# Patient Record
Sex: MALE | Race: AMERICAN INDIAN OR ALASKA NATIVE
[De-identification: names, ages, dates, MRNs, and addresses within clinical notes are randomized per-mention and may not be internally consistent; named-entity substitution may affect disease eponyms.]

---

## 2017-08-11 ENCOUNTER — HOSPITAL ENCOUNTER (EMERGENCY)
Dept: HOSPITAL 31 - C.ER | Age: 57
Discharge: HOME | End: 2017-08-11
Payer: COMMERCIAL

## 2017-08-11 VITALS
TEMPERATURE: 97.7 F | SYSTOLIC BLOOD PRESSURE: 201 MMHG | OXYGEN SATURATION: 99 % | HEART RATE: 88 BPM | DIASTOLIC BLOOD PRESSURE: 91 MMHG

## 2017-08-11 VITALS — RESPIRATION RATE: 18 BRPM

## 2017-08-11 DIAGNOSIS — I10: Primary | ICD-10-CM

## 2017-08-11 LAB
ALBUMIN/GLOB SERPL: 1.1 {RATIO} (ref 1–2.1)
ALP SERPL-CCNC: 77 U/L (ref 38–126)
ALT SERPL-CCNC: 32 U/L (ref 21–72)
AST SERPL-CCNC: 37 U/L (ref 17–59)
BASOPHILS # BLD AUTO: 0 K/UL (ref 0–0.2)
BASOPHILS NFR BLD: 0.7 % (ref 0–2)
BILIRUB SERPL-MCNC: 0.7 MG/DL (ref 0.2–1.3)
BUN SERPL-MCNC: 14 MG/DL (ref 9–20)
CALCIUM SERPL-MCNC: 8.8 MG/DL (ref 8.6–10.4)
CHLORIDE SERPL-SCNC: 107 MMOL/L (ref 98–107)
CO2 SERPL-SCNC: 23 MMOL/L (ref 22–30)
EOSINOPHIL # BLD AUTO: 0.1 K/UL (ref 0–0.7)
EOSINOPHIL NFR BLD: 1.4 % (ref 0–4)
ERYTHROCYTE [DISTWIDTH] IN BLOOD BY AUTOMATED COUNT: 13.8 % (ref 11.5–14.5)
GLOBULIN SER-MCNC: 3.5 GM/DL (ref 2.2–3.9)
GLUCOSE SERPL-MCNC: 100 MG/DL (ref 75–110)
HCT VFR BLD CALC: 46 % (ref 35–51)
LYMPHOCYTES # BLD AUTO: 1.7 K/UL (ref 1–4.3)
LYMPHOCYTES NFR BLD AUTO: 34.4 % (ref 20–40)
MCH RBC QN AUTO: 30.8 PG (ref 27–31)
MCHC RBC AUTO-ENTMCNC: 33.3 G/DL (ref 33–37)
MCV RBC AUTO: 92.7 FL (ref 80–94)
MONOCYTES # BLD: 0.5 K/UL (ref 0–0.8)
MONOCYTES NFR BLD: 10.4 % (ref 0–10)
NRBC BLD AUTO-RTO: 0.1 % (ref 0–2)
PLATELET # BLD: 157 K/UL (ref 130–400)
PMV BLD AUTO: 11 FL (ref 7.2–11.7)
POTASSIUM SERPL-SCNC: 3.9 MMOL/L (ref 3.6–5.2)
PROT SERPL-MCNC: 7.2 G/DL (ref 6.3–8.3)
SODIUM SERPL-SCNC: 142 MMOL/L (ref 132–148)
WBC # BLD AUTO: 4.9 K/UL (ref 4.8–10.8)

## 2017-08-11 PROCEDURE — 71010: CPT

## 2017-08-11 PROCEDURE — 83880 ASSAY OF NATRIURETIC PEPTIDE: CPT

## 2017-08-11 PROCEDURE — 80053 COMPREHEN METABOLIC PANEL: CPT

## 2017-08-11 PROCEDURE — 96374 THER/PROPH/DIAG INJ IV PUSH: CPT

## 2017-08-11 PROCEDURE — 85025 COMPLETE CBC W/AUTO DIFF WBC: CPT

## 2017-08-11 PROCEDURE — 99284 EMERGENCY DEPT VISIT MOD MDM: CPT

## 2017-08-11 PROCEDURE — 84484 ASSAY OF TROPONIN QUANT: CPT

## 2017-08-11 NOTE — C.PDOC
History Of Present Illness


55 y/o male presents to ED with complaints of intermittent chest pain for 5 

days and occasional sob after walking flight of stairs. Patient reports he has 

not seen PMD in 2 years. AT ed patient is currently pain free and denies fever, 

cough, nausea, vomiting, recent travel, change in appetite or any other 

complaints at this time. 


Time Seen by Provider: 17 13:08


Chief Complaint (Nursing): Chest Pain


History Per: Patient


History/Exam Limitations: no limitations


Onset/Duration Of Symptoms: Days


Current Symptoms Are (Timing): Still Present





Past Medical History


Reviewed: Historical Data, Nursing Documentation, Vital Signs


Vital Signs: 


 Last Vital Signs











Temp  97.7 F   17 14:49


 


Pulse  88   17 14:49


 


Resp  18   17 14:49


 


BP  201/91 H  17 14:49


 


Pulse Ox  99   17 17:50














- Medical History


PMH: HTN


Family History: States: No Known Family Hx





- Social History


Hx Alcohol Use: Yes


Hx Substance Use: No





- Immunization History


Hx Tetanus Toxoid Vaccination: No


Hx Influenza Vaccination: No


Hx Pneumococcal Vaccination: No





Review Of Systems


Except As Marked, All Systems Reviewed And Found Negative.


Constitutional: Negative for: Fever, Chills


Cardiovascular: Positive for: Chest Pain


Respiratory: Positive for: Shortness of Breath.  Negative for: Cough


Gastrointestinal: Negative for: Nausea, Vomiting


Skin: Negative for: Rash





Physical Exam





- Physical Exam


Appears: Non-toxic, No Acute Distress


Skin: Normal Color, Warm, No Rash


Head: Atraumatic, Normacephalic


Eye(s): bilateral: Normal Inspection


Oral Mucosa: Moist


Chest: Symmetrical


Cardiovascular: Rhythm Regular


Respiratory: Normal Breath Sounds, No Rales, No Rhonchi, No Wheezing


Gastrointestinal/Abdominal: Soft, No Tenderness, No Guarding, No Rebound


Neurological/Psych: Oriented x3, Normal Speech





ED Course And Treatment





- Laboratory Results


Result Diagrams: 


 17 13:42





 17 13:42


ECG: Interpreted By Me, Viewed By Me


ECG Rhythm: Sinus Rhythm


Interpretation Of ECG: LVH with repolarization abnormality


Rate From EC (bpm)


O2 Sat by Pulse Oximetry: 99 (RA)


Pulse Ox Interpretation: Normal





Medical Decision Making


Medical Decision Making: 


Patient will be discharged and advised to follow up with PMD. 





Disposition





- Disposition


Referrals: 


 Service [Outside]


[facility] [Outside]


Disposition: HOME/ ROUTINE


Disposition Time: 14:20


Condition: GOOD


Additional Instructions: 





Thank you for letting us take care of you today. Your provider was Dr. Sevilla. You were treated for non-cardiac chest pain. The emergency medical 

care you received today was directed at your acute symptoms. If you were 

prescribed any medication, please fill it and take as directed. It may take 

several days for your symptoms to resolve. Return to the Emergency Department 

if your symptoms worsen, do not improve, or if you have any other problems.





Please contact your doctor or call one of the physicians/clinics you have been 

referred to that are listed on the Patient Visit Information form that is 

included in your discharge packet. Bring any paperwork you were given at 

discharge with you along with any medications you are taking to your follow up 

visit. Our treatment cannot replace ongoing medical care by a primary care 

provider (PCP) outside of the emergency department.





Thank you for allowing the [facility] team to be part of your care today.














Follow up with the clinic in 3-4 days for outpatient care and further 

management.


Prescriptions: 


Hydrochlorothiazide [Microzide] 12.5 mg PO DAILY #7 cap


Ibuprofen [Motrin] 600 mg PO Q6 PRN #20 tab


 PRN Reason: Pain, Moderate (4-7)


Instructions:  Low Sodium Diet (ED), Hypertension (ED)


Forms:  [facility] (English)





- Clinical Impression


Clinical Impression: 


 Hypertension








- Scribe Statement


The provider has reviewed the documentation as recorded by the Isaiasibcarole Tinajero





All medical record entries made by the Scribe were at my direction and 

personally dictated by me. I have reviewed the chart and agree that the record 

accurately reflects my personal performance of the history, physical exam, 

medical decision making, and the department course for this patient. I have 

also personally directed, reviewed, and agree with the discharge instructions 

and disposition.

## 2017-08-14 NOTE — CARD
--------------- APPROVED REPORT --------------





EKG Measurement

Heart [id number]

IA [id number]

[id number]

[id number]

[id number]



<Conclusion>

Normal sinus rhythm

Possible Left atrial enlargement

Left ventricular hypertrophy with repolarization abnormality

Prolonged QT

Abnormal ECG

## 2018-10-04 ENCOUNTER — HOSPITAL ENCOUNTER (INPATIENT)
Dept: HOSPITAL 31 - C.ER | Age: 58
LOS: 4 days | Discharge: LEFT BEFORE DISCHARGE | DRG: 292 | End: 2018-10-08
Attending: FAMILY MEDICINE | Admitting: FAMILY MEDICINE
Payer: SELF-PAY

## 2018-10-04 VITALS — BODY MASS INDEX: 35 KG/M2

## 2018-10-04 VITALS — RESPIRATION RATE: 20 BRPM

## 2018-10-04 DIAGNOSIS — I50.22: ICD-10-CM

## 2018-10-04 DIAGNOSIS — E66.9: ICD-10-CM

## 2018-10-04 DIAGNOSIS — J98.11: ICD-10-CM

## 2018-10-04 DIAGNOSIS — Z80.51: ICD-10-CM

## 2018-10-04 DIAGNOSIS — F17.210: ICD-10-CM

## 2018-10-04 DIAGNOSIS — G47.33: ICD-10-CM

## 2018-10-04 DIAGNOSIS — I20.9: ICD-10-CM

## 2018-10-04 DIAGNOSIS — I11.0: Primary | ICD-10-CM

## 2018-10-04 DIAGNOSIS — N52.9: ICD-10-CM

## 2018-10-04 DIAGNOSIS — K57.30: ICD-10-CM

## 2018-10-04 DIAGNOSIS — I42.0: ICD-10-CM

## 2018-10-04 DIAGNOSIS — Z91.14: ICD-10-CM

## 2018-10-04 DIAGNOSIS — N28.1: ICD-10-CM

## 2018-10-04 LAB
ALBUMIN SERPL-MCNC: 3.7 G/DL (ref 3.5–5)
ALBUMIN/GLOB SERPL: 1.1 {RATIO} (ref 1–2.1)
ALT SERPL-CCNC: 22 U/L (ref 21–72)
APTT BLD: 35 SECONDS (ref 21–34)
AST SERPL-CCNC: 36 U/L (ref 17–59)
BACTERIA #/AREA URNS HPF: (no result) /[HPF]
BASOPHILS # BLD AUTO: 0.1 K/UL (ref 0–0.2)
BASOPHILS NFR BLD: 1 % (ref 0–2)
BILIRUB UR-MCNC: NEGATIVE MG/DL
BNP SERPL-MCNC: 3710 PG/ML (ref 0–900)
BUN SERPL-MCNC: 12 MG/DL (ref 9–20)
CALCIUM SERPL-MCNC: 8.8 MG/DL (ref 8.6–10.4)
CK MB SERPL-MCNC: 2.11 NG/ML (ref 0–3.38)
D DIMER: 470 NG/MLDDU (ref 0–243)
EOSINOPHIL # BLD AUTO: 0.1 K/UL (ref 0–0.7)
EOSINOPHIL NFR BLD: 1.2 % (ref 0–4)
ERYTHROCYTE [DISTWIDTH] IN BLOOD BY AUTOMATED COUNT: 14.4 % (ref 11.5–14.5)
GFR NON-AFRICAN AMERICAN: > 60
GLUCOSE UR STRIP-MCNC: NORMAL MG/DL
HGB BLD-MCNC: 15.3 G/DL (ref 12–18)
INR PPP: 1.1
LEUKOCYTE ESTERASE UR-ACNC: (no result) LEU/UL
LYMPHOCYTES # BLD AUTO: 1.2 K/UL (ref 1–4.3)
LYMPHOCYTES NFR BLD AUTO: 22.8 % (ref 20–40)
MCH RBC QN AUTO: 31.8 PG (ref 27–31)
MCHC RBC AUTO-ENTMCNC: 34.4 G/DL (ref 33–37)
MCV RBC AUTO: 92.5 FL (ref 80–94)
MONOCYTES # BLD: 0.6 K/UL (ref 0–0.8)
MONOCYTES NFR BLD: 10.3 % (ref 0–10)
NEUTROPHILS # BLD: 3.5 K/UL (ref 1.8–7)
NEUTROPHILS NFR BLD AUTO: 64.7 % (ref 50–75)
NRBC BLD AUTO-RTO: 0.1 % (ref 0–2)
PH UR STRIP: 6 [PH] (ref 5–8)
PLATELET # BLD: 204 K/UL (ref 130–400)
PMV BLD AUTO: 9.6 FL (ref 7.2–11.7)
PROT UR STRIP-MCNC: NEGATIVE MG/DL
PROTHROMBIN TIME: 12 SECONDS (ref 9.7–12.2)
RBC # BLD AUTO: 4.82 MIL/UL (ref 4.4–5.9)
RBC # UR STRIP: NEGATIVE /UL
SP GR UR STRIP: 1 (ref 1–1.03)
TROPONIN I SERPL-MCNC: 0.07 NG/ML (ref 0–0.12)
UROBILINOGEN UR-MCNC: NORMAL MG/DL (ref 0.2–1)
WBC # BLD AUTO: 5.5 K/UL (ref 4.8–10.8)

## 2018-10-04 NOTE — CP.PCM.HP
<Holly Martinez - Last Filed: 10/05/18 03:54>





History of Present Illness





- History of Present Illness


History of Present Illness: 





History and Physical - Hospitalist Service





CC: Progressive Dyspnea on exertion, chest pain





HPI: Patient is a 58 year old  male with past medical history of

Hypertension who presented to the emergency department for worsening dyspnea and

chest pain. Patient states that he has been having increasing dyspnea on 

exertion for the past 2-3 months. He states that he is able to walk up 1 flight 

of stairs before having to stop due to shortness of breath. He states that he 

sleeps with one pillow at night. Patient states that chest pain started a few m

onths ago as well. It is intermittent in nature and he believes is associated 

with shortness of breath. Chest pain is left sided and does not radiate. It was 

the most severe last night when he was laying down. Describes the pain as 

concurrent. He denies any alleviating or exacerbating factors. Not associated 

with movement. Denies diaphoresis, nausea, vomiting. He state that he fell 

walking down a flight of steps 3 months ago. He was not having chest pain or 

shortness of breath at that time. Admits to fatigue and not being able to sleep 

at night. He is unable to explain why he cannot sleep at night. Denies fevers, 

chills, nausea/vomiting, recent travel, sick contacts, headaches, dizziness, co

ugh, palpitations, abdominal pain, orthopnea, lower extremity swelling, urinary 

symptoms, changes in bowel habits, hematuria. 








ED Course: Lasix 20mg IVP, Vastec 40mg PO x 1, Nitro 2%





Allergies: NKDA


Medications: HCTZ 12.5mg PO daily (ran out months ago)


Medical History: Hypertension


Surgical History: Denies


Social History: Smokes 1 pack of cigarettes every 3 days, drinks 6 pack of beer 

on weekends occasionally, denies drug use; works as contractor, lives alone


Family History: Mother - brain tumor, CVA in 70s, Father - CVA in 80s, Brother -

Kidney cancer (), Brother - Leg cancer





Present on Admission





- Present on Admission


Any Indicators Present on Admission: No





Past Patient History





- Past Social History


Smoking Status: Light Smoker < 10 Cigarettes Daily





- CARDIAC


Hx Hypertension: Yes





- PSYCHIATRIC


Hx Substance Use: No





- SURGICAL HISTORY


Hx Surgeries: No





- ANESTHESIA


Hx Anesthesia: No





Meds


Allergies/Adverse Reactions: 


                                    Allergies











Allergy/AdvReac Type Severity Reaction Status Date / Time


 


No Known Allergies Allergy   Verified 10/04/18 15:03














Physical Exam





- Constitutional


Appears: Non-toxic, No Acute Distress





- Head Exam


Head Exam: ATRAUMATIC, NORMAL INSPECTION, NORMOCEPHALIC





- Eye Exam


Eye Exam: EOMI, Normal appearance


Pupil Exam: NORMAL ACCOMODATION





- ENT Exam


ENT Exam: Mucous Membranes Moist





- Neck Exam


Neck exam: Positive for: Full Rom.  Negative for: Tenderness





- Respiratory Exam


Respiratory Exam: Decreased Breath Sounds, NORMAL BREATHING PATTERN.  absent: 

Rales, Rhonchi, Wheezes





- Cardiovascular Exam


Cardiovascular Exam: REGULAR RHYTHM, +S1, +S2.  absent: Systolic Murmur





- GI/Abdominal Exam


GI & Abdominal Exam: Distended, Normal Bowel Sounds, Soft.  absent: Guarding, 

Hernia, Rebound, Rigid, Tenderness





- Extremities Exam


Extremities exam: Positive for: normal inspection, pedal pulses present.  

Negative for: calf tenderness, joint swelling, pedal edema, tenderness





- Back Exam


Back exam: CVA tenderness (R), NORMAL INSPECTION





- Neurological Exam


Neurological exam: Alert, Oriented x3





- Psychiatric Exam


Psychiatric exam: Normal Affect, Normal Mood





- Skin


Skin Exam: Dry, Normal Color, Warm





Results





- Vital Signs


Recent Vital Signs: 





                                Last Vital Signs











Temp  97.7 F   10/04/18 15:05


 


Pulse  81   10/04/18 18:44


 


Resp  19   10/04/18 18:44


 


BP  151/91 H  10/04/18 18:44


 


Pulse Ox  96   10/04/18 18:44














- Labs


Result Diagrams: 


                                 10/04/18 15:59





                                 10/04/18 15:59


Labs: 





                         Laboratory Results - last 24 hr











  10/04/18 10/04/18 10/04/18





  15:59 15:59 16:55


 


WBC  5.5  


 


RBC  4.82  


 


Hgb  15.3  


 


Hct  44.6  


 


MCV  92.5  


 


MCH  31.8 H  


 


MCHC  34.4  


 


RDW  14.4  


 


Plt Count  204  


 


MPV  9.6  


 


Neut % (Auto)  64.7  


 


Lymph % (Auto)  22.8  


 


Mono % (Auto)  10.3 H  


 


Eos % (Auto)  1.2  


 


Baso % (Auto)  1.0  


 


Neut # (Auto)  3.5  


 


Lymph # (Auto)  1.2  


 


Mono # (Auto)  0.6  


 


Eos # (Auto)  0.1  


 


Baso # (Auto)  0.1  


 


PT    12.0


 


INR    1.1


 


APTT    35 H


 


D-Dimer, Quantitative    470 H


 


Sodium   142 


 


Potassium   5.1 


 


Chloride   109 H 


 


Carbon Dioxide   23 


 


Anion Gap   15 


 


BUN   12 


 


Creatinine   1.0 


 


Est GFR ( Amer)   > 60 


 


Est GFR (Non-Af Amer)   > 60 


 


Random Glucose   98 


 


Calcium   8.8 


 


Total Bilirubin   1.1 


 


AST   36 


 


ALT   22 


 


Alkaline Phosphatase   61 


 


Troponin I   0.0680 


 


NT-Pro-B Natriuret Pep   3710 H 


 


Total Protein   7.1 


 


Albumin   3.7 


 


Globulin   3.4 


 


Albumin/Globulin Ratio   1.1 


 


Urine Color   


 


Urine Clarity   


 


Urine pH   


 


Ur Specific Gravity   


 


Urine Protein   


 


Urine Glucose (UA)   


 


Urine Ketones   


 


Urine Blood   


 


Urine Nitrate   


 


Urine Bilirubin   


 


Urine Urobilinogen   


 


Ur Leukocyte Esterase   


 


Urine WBC (Auto)   


 


Urine Bacteria   


 


Urine Opiates Screen   


 


Urine Methadone Screen   


 


Ur Barbiturates Screen   


 


Ur Phencyclidine Scrn   


 


Ur Amphetamines Screen   


 


U Benzodiazepines Scrn   


 


U Oth Cocaine Metabols   


 


U Cannabinoids Screen   














  10/04/18 10/04/18





  17:11 17:11


 


WBC  


 


RBC  


 


Hgb  


 


Hct  


 


MCV  


 


MCH  


 


MCHC  


 


RDW  


 


Plt Count  


 


MPV  


 


Neut % (Auto)  


 


Lymph % (Auto)  


 


Mono % (Auto)  


 


Eos % (Auto)  


 


Baso % (Auto)  


 


Neut # (Auto)  


 


Lymph # (Auto)  


 


Mono # (Auto)  


 


Eos # (Auto)  


 


Baso # (Auto)  


 


PT  


 


INR  


 


APTT  


 


D-Dimer, Quantitative  


 


Sodium  


 


Potassium  


 


Chloride  


 


Carbon Dioxide  


 


Anion Gap  


 


BUN  


 


Creatinine  


 


Est GFR ( Amer)  


 


Est GFR (Non-Af Amer)  


 


Random Glucose  


 


Calcium  


 


Total Bilirubin  


 


AST  


 


ALT  


 


Alkaline Phosphatase  


 


Troponin I  


 


NT-Pro-B Natriuret Pep  


 


Total Protein  


 


Albumin  


 


Globulin  


 


Albumin/Globulin Ratio  


 


Urine Color  Yellow 


 


Urine Clarity  Clear 


 


Urine pH  6.0 


 


Ur Specific Gravity  1.004 


 


Urine Protein  Negative 


 


Urine Glucose (UA)  Normal 


 


Urine Ketones  Negative 


 


Urine Blood  Negative 


 


Urine Nitrate  Negative 


 


Urine Bilirubin  Negative 


 


Urine Urobilinogen  Normal 


 


Ur Leukocyte Esterase  Neg 


 


Urine WBC (Auto)  < 1 


 


Urine Bacteria  Rare 


 


Urine Opiates Screen   Negative


 


Urine Methadone Screen   Negative


 


Ur Barbiturates Screen   Negative


 


Ur Phencyclidine Scrn   Negative


 


Ur Amphetamines Screen   Negative


 


U Benzodiazepines Scrn   Negative


 


U Oth Cocaine Metabols   Negative


 


U Cannabinoids Screen   Negative














Assessment & Plan





- Assessment and Plan (Free Text)


Assessment: 





A/P: Patient is a 58 year old  male with past medical history of

Hypertension who presented to the Emergency Dept for worsening dyspnea and left 

sided chest pain.





Dyspnea likely secondary to new onset CHF


-Stable, afebrile


-EKG showed LVH with inverted T waves


-Initial Troponin negative, Trend MARGE Q8H x 2


-CXR showed Cardiomegaly with increased pulmonary vascular congestion (see full 

report)


-S/P Lasix 20mg IVP x 1


-Echocardiogram ordered


-Start on Lisinopril 10mg daily, Coreg 3.125mg PO BID


-Continue Lasix 20mg IVP daily


-Daily weights, Intake and output


-Cardiology on consult, Dr Rocha, help appreciated





Chest pain, r/o ACS


-EKG showed LVH with inverted T waves


-Initial Troponin negative, Trend MARGE Q8H x 2


-F/U TSH/Free T4, Lipid panel, Hemoglobin A1C


-Cardiology on consult, Dr Rocha, help appreciated





Hypertension


-Started on Lisinopril 10mg PO daily, Coreg 3.125mg PO BID


-Monitor Vitals





Renal Cysts


-Up to 6.6cm renal cyst noted on abdominal US


-Will order CT abd/pelvis for further evaluation





Tobacco abuse


-Cessation encouraged





GI/DVT ppx:


-Protonix 40mg PO daily


-Lovenox 40mg SC daily





Plan discussed with Dr Gustabo Martinez DO PGY-2





<Daniel Montejo P - Last Filed: 10/05/18 07:09>





Results





- Vital Signs


Recent Vital Signs: 





                                Last Vital Signs











Temp  98.1 F   10/05/18 04:00


 


Pulse  78   10/05/18 04:00


 


Resp  20   10/05/18 04:00


 


BP  148/100 H  10/05/18 04:00


 


Pulse Ox  96   10/05/18 04:00














- Labs


Result Diagrams: 


                                 10/04/18 15:59





                                 10/04/18 15:59


Labs: 





                         Laboratory Results - last 24 hr











  10/04/18 10/04/18 10/04/18





  15:59 15:59 16:55


 


WBC  5.5  


 


RBC  4.82  


 


Hgb  15.3  


 


Hct  44.6  


 


MCV  92.5  


 


MCH  31.8 H  


 


MCHC  34.4  


 


RDW  14.4  


 


Plt Count  204  


 


MPV  9.6  


 


Neut % (Auto)  64.7  


 


Lymph % (Auto)  22.8  


 


Mono % (Auto)  10.3 H  


 


Eos % (Auto)  1.2  


 


Baso % (Auto)  1.0  


 


Neut # (Auto)  3.5  


 


Lymph # (Auto)  1.2  


 


Mono # (Auto)  0.6  


 


Eos # (Auto)  0.1  


 


Baso # (Auto)  0.1  


 


PT    12.0


 


INR    1.1


 


APTT    35 H


 


D-Dimer, Quantitative    470 H


 


Sodium   142 


 


Potassium   5.1 


 


Chloride   109 H 


 


Carbon Dioxide   23 


 


Anion Gap   15 


 


BUN   12 


 


Creatinine   1.0 


 


Est GFR ( Amer)   > 60 


 


Est GFR (Non-Af Amer)   > 60 


 


Random Glucose   98 


 


Calcium   8.8 


 


Total Bilirubin   1.1 


 


AST   36 


 


ALT   22 


 


Alkaline Phosphatase   61 


 


Total Creatine Kinase   


 


CK-MB (Mass)   


 


Troponin I   0.0680 


 


NT-Pro-B Natriuret Pep   3710 H 


 


Total Protein   7.1 


 


Albumin   3.7 


 


Globulin   3.4 


 


Albumin/Globulin Ratio   1.1 


 


Urine Color   


 


Urine Clarity   


 


Urine pH   


 


Ur Specific Gravity   


 


Urine Protein   


 


Urine Glucose (UA)   


 


Urine Ketones   


 


Urine Blood   


 


Urine Nitrate   


 


Urine Bilirubin   


 


Urine Urobilinogen   


 


Ur Leukocyte Esterase   


 


Urine WBC (Auto)   


 


Urine Bacteria   


 


Urine Opiates Screen   


 


Urine Methadone Screen   


 


Ur Barbiturates Screen   


 


Ur Phencyclidine Scrn   


 


Ur Amphetamines Screen   


 


U Benzodiazepines Scrn   


 


U Oth Cocaine Metabols   


 


U Cannabinoids Screen   














  10/04/18 10/04/18 10/04/18





  17:11 17:11 22:40


 


WBC   


 


RBC   


 


Hgb   


 


Hct   


 


MCV   


 


MCH   


 


MCHC   


 


RDW   


 


Plt Count   


 


MPV   


 


Neut % (Auto)   


 


Lymph % (Auto)   


 


Mono % (Auto)   


 


Eos % (Auto)   


 


Baso % (Auto)   


 


Neut # (Auto)   


 


Lymph # (Auto)   


 


Mono # (Auto)   


 


Eos # (Auto)   


 


Baso # (Auto)   


 


PT   


 


INR   


 


APTT   


 


D-Dimer, Quantitative   


 


Sodium   


 


Potassium   


 


Chloride   


 


Carbon Dioxide   


 


Anion Gap   


 


BUN   


 


Creatinine   


 


Est GFR ( Amer)   


 


Est GFR (Non-Af Amer)   


 


Random Glucose   


 


Calcium   


 


Total Bilirubin   


 


AST   


 


ALT   


 


Alkaline Phosphatase   


 


Total Creatine Kinase    134


 


CK-MB (Mass)    2.11


 


Troponin I    0.0680


 


NT-Pro-B Natriuret Pep   


 


Total Protein   


 


Albumin   


 


Globulin   


 


Albumin/Globulin Ratio   


 


Urine Color  Yellow  


 


Urine Clarity  Clear  


 


Urine pH  6.0  


 


Ur Specific Gravity  1.004  


 


Urine Protein  Negative  


 


Urine Glucose (UA)  Normal  


 


Urine Ketones  Negative  


 


Urine Blood  Negative  


 


Urine Nitrate  Negative  


 


Urine Bilirubin  Negative  


 


Urine Urobilinogen  Normal  


 


Ur Leukocyte Esterase  Neg  


 


Urine WBC (Auto)  < 1  


 


Urine Bacteria  Rare  


 


Urine Opiates Screen   Negative 


 


Urine Methadone Screen   Negative 


 


Ur Barbiturates Screen   Negative 


 


Ur Phencyclidine Scrn   Negative 


 


Ur Amphetamines Screen   Negative 


 


U Benzodiazepines Scrn   Negative 


 


U Oth Cocaine Metabols   Negative 


 


U Cannabinoids Screen   Negative 














Attending/Attestation





- Attestation


I have personally seen and examined this patient.: Yes


I have fully participated in the care of the patient.: Yes


I have reviewed all pertinent clinical information: Yes


Notes (Text): 





10/05/18 06:56


Progressive sob and cp on exertion, no orthpnea, clinically not volume overload,

LVH with strain pattern on the ekg, with uncontrolled htn, DD of hypertensive 

cardiomyopathy vs previous with new angina.


Noncompliance with meds


Abdominal distension likely form fat, weight gain


Tobacco abuse





Plan


ACEI, Beta blocker for cardiac remodelling, added hctz for better control of BP 

as well


ASA, Crestor as he as risk factors and cad clinically


Echo in patient


Stress test in patient vs out patient


Counselled about tobacco cessation, compliance with meds and health


Abd/pelvis ct with oral contrast ordered due to increased abd girth


Gi/dvt prophylaxis 


Cardiology consult


See orders for detail.

## 2018-10-04 NOTE — RAD
Date of service: 



10/04/2018



PROCEDURE:  CHEST RADIOGRAPH, 1 VIEW



HISTORY:

SOB



COMPARISON:

08/11/2017



FINDINGS:



LUNGS:

Pulmonary vascular congestion.  No discrete infiltrates.



PLEURA:

No pneumothorax or pleural fluid seen.



CARDIOVASCULAR:

Cardiomegaly.



OSSEOUS STRUCTURES:

No significant abnormalities.



VISUALIZED UPPER ABDOMEN:

Normal.



OTHER FINDINGS:

None. 



IMPRESSION:

Cardiomegaly/new pulmonary vascular congestion compared to the prior 

study.

## 2018-10-04 NOTE — C.PDOC
History Of Present Illness


59 y/o male presents to ED for evaluation of worsening dyspnea on exertion for 

the last 2 months since he ran out of his medications. He reports difficulty 

with walking up the stairs due to SOB, and SOB with minimal exertion. Smokes 1/2

ppd.  He reports increased abdominal girth without increased PO intake. Denies 

other complaints. 





Time Seen by Provider: 10/04/18 15:14


Chief Complaint (Nursing): Chest Pain


History Per: Patient


History/Exam Limitations: no limitations





Past Medical History


Reviewed: Historical Data, Nursing Documentation, Vital Signs


Vital Signs: 





                                Last Vital Signs











Temp  97.7 F   10/04/18 15:05


 


Pulse  86   10/04/18 15:05


 


Resp  23   10/04/18 15:05


 


BP  169/120 H  10/04/18 15:05


 


Pulse Ox  100   10/04/18 15:05














- Medical History


PMH: HTN


Family History: States: Unknown Family Hx





- Social History


Hx Alcohol Use: Yes


Hx Substance Use: No





- Immunization History


Hx Tetanus Toxoid Vaccination: No


Hx Influenza Vaccination: No


Hx Pneumococcal Vaccination: No





Review Of Systems


Constitutional: Positive for: Other (severe sleep apnea, + orthopnea).  Negative

for: Fever, Chills


Cardiovascular: Negative for: Chest Pain, Palpitations, Edema


Respiratory: Positive for: Shortness of Breath, SOB with Excertion


Gastrointestinal: Negative for: Nausea, Abdominal Pain





Physical Exam





- Physical Exam


Appears: Non-toxic, No Acute Distress, Other (morbildy obese)


Skin: Normal Color, Warm, Dry


Head: Atraumatic, Normacephalic


Eye(s): bilateral: Normal Inspection


Oral Mucosa: Moist


Neck: Supple


Chest: Symmetrical


Cardiovascular: JVD


Respiratory: Normal Breath Sounds, No Rales, No Rhonchi, No Wheezing


Gastrointestinal/Abdominal: Soft, No Tenderness, No Guarding, No Rebound, Other 

(globus abdomen, dull to percussion, shifting fluid?)


Back: No CVA Tenderness


Extremity: Normal ROM, Pedal Edema (1/4)


Neurological/Psych: Oriented x3, Normal Speech





ED Course And Treatment





- Laboratory Results


Result Diagrams: 


                                 10/04/18 15:59





                                 10/04/18 15:59


Lab Interpretation: Abnormal (bnp 3700, trop neg, d-dimer mild elev 470 (below 

age limit for abn))


ECG: Interpreted By Me


ECG Rhythm: Sinus Rhythm


ECG Interpretation: Normal, Abnormal (LVH)


Rate From EC


O2 Sat by Pulse Oximetry: 100





- Radiology


CXR: Interpreted by Me


CXR Interpretation: Yes: Heart Size, Other (++CHF, ++ Cardiomegaly)


Reevaluation Time: 17:35 (diuresed 2 L light Lasix urine, but still ++ HTN, 

Lisinopril 40 po ordered)





- Physician Consult Information


Outcome Of Conversation: 1730: d/w Dr. Chaves- Hospitalist On Call- covering 

self-pay pt, ok to admit





Medical Decision Making


Medical Decision Making: 





Acutual pre-diuresis body weight 270# in ED 


(baseline 250#, per pt)








CHF


Severe dilated cardiomyopathy per CXR


mild leg edema


continue lasix





Uncontrolled HTN


ran out of meds 2 mo ago


continue HTN meds 





CLIFFORD:


Severe s/s


poor sleep


poor PO intake but steady weight gait


consider Bipap/Sleep study





Abd obesity


Abd US fatty liver only


continued weight loss.





Disposition


Doctor Will See Patient In The: Hospital


Counseled Patient/Family Regarding: Studies Performed, Diagnosis





- Disposition


Disposition: HOSPITALIZED


Disposition Time: 17:33


Condition: FAIR


Forms:  [facility] (English)





- Clinical Impression


Clinical Impression: 


 CHF (congestive heart failure), Sleep apnea in adult, Uncontrolled hypertension








- Scribe Statement


The provider has reviewed the documentation as recorded by the Scribe





KP





All medical record entries made by the Scribe were at my direction and 

personally dictated by me. I have reviewed the chart and agree that the record 

accurately reflects my personal performance of the history, physical exam, 

medical decision making, and the department course for this patient. I have also

personally directed, reviewed, and agree with the discharge instructions and 

disposition.

## 2018-10-04 NOTE — US
HISTORY:

abd enlarged, ? ascites/liver



COMPARISON:

None available.



TECHNIQUE:

Sonographic evaluation of the abdomen.



FINDINGS:



LIVER:

Measures 18.2 cm in sagittal dimension. Echogenic liver may be seen 

in setting of hepatic parenchymal disease or fatty infiltration.   No 

focal hepatic mass identified. The main portal vein appears patent 

with normal directional flow.   No intrahepatic bile duct dilatation.



GALLBLADDER:

No gallstones. No gallbladder wall thickening. Negative sonographic 

Sutton's sign as assessed by the sonographer.



COMMON BILE DUCT:

Measures 4 mm. 



PANCREAS:

Not well visualized.



RIGHT KIDNEY:

Measures 9.7 x 4.9 x 5.1 cm.  No obstructing calculus or 

hydronephrosis identified. 



LEFT KIDNEY:

Measures 11.4 x 5.3 x 4.7 cm.  No obstructing calculus or 

hydronephrosis identified.  5.9 x 6.6 x 5.9 cm lower pole cyst.  1.9 

x 1.3 x 1.6 cm lower pole cyst.  2.3 x 1.6 x 1.9 cm midpole cyst. 



SPLEEN:

Measures approximately 8 cm. 



AORTA:

Limited views appear unremarkable. 



IVC:

Limited views appear unremarkable. 



OTHER FINDINGS:

None. 



IMPRESSION:

Echogenic liver may be seen in setting of hepatic parenchymal disease 

or fatty infiltration. 



Left renal cysts measuring up to 6.6 cm.

## 2018-10-05 LAB
ALBUMIN SERPL-MCNC: 3.6 G/DL (ref 3.5–5)
ALBUMIN/GLOB SERPL: 1.1 {RATIO} (ref 1–2.1)
ALT SERPL-CCNC: 22 U/L (ref 21–72)
AST SERPL-CCNC: 21 U/L (ref 17–59)
BASOPHILS # BLD AUTO: 0 K/UL (ref 0–0.2)
BASOPHILS NFR BLD: 0.6 % (ref 0–2)
BUN SERPL-MCNC: 14 MG/DL (ref 9–20)
CALCIUM SERPL-MCNC: 9.1 MG/DL (ref 8.6–10.4)
CK MB SERPL-MCNC: 2.17 NG/ML (ref 0–3.38)
EOSINOPHIL # BLD AUTO: 0.1 K/UL (ref 0–0.7)
EOSINOPHIL NFR BLD: 1.6 % (ref 0–4)
ERYTHROCYTE [DISTWIDTH] IN BLOOD BY AUTOMATED COUNT: 14.2 % (ref 11.5–14.5)
GFR NON-AFRICAN AMERICAN: > 60
HDLC SERPL-MCNC: 36 MG/DL (ref 30–70)
HGB BLD-MCNC: 15 G/DL (ref 12–18)
LDLC SERPL-MCNC: 115 MG/DL (ref 0–129)
LYMPHOCYTES # BLD AUTO: 1.4 K/UL (ref 1–4.3)
LYMPHOCYTES NFR BLD AUTO: 24.9 % (ref 20–40)
MCH RBC QN AUTO: 32.1 PG (ref 27–31)
MCHC RBC AUTO-ENTMCNC: 34.1 G/DL (ref 33–37)
MCV RBC AUTO: 94 FL (ref 80–94)
MONOCYTES # BLD: 0.4 K/UL (ref 0–0.8)
MONOCYTES NFR BLD: 8.1 % (ref 0–10)
NEUTROPHILS # BLD: 3.5 K/UL (ref 1.8–7)
NEUTROPHILS NFR BLD AUTO: 64.8 % (ref 50–75)
NRBC BLD AUTO-RTO: 0.1 % (ref 0–2)
PLATELET # BLD: 187 K/UL (ref 130–400)
PMV BLD AUTO: 10 FL (ref 7.2–11.7)
RBC # BLD AUTO: 4.67 MIL/UL (ref 4.4–5.9)
WBC # BLD AUTO: 5.4 K/UL (ref 4.8–10.8)

## 2018-10-05 RX ADMIN — ENOXAPARIN SODIUM SCH MG: 40 INJECTION SUBCUTANEOUS at 09:33

## 2018-10-05 NOTE — CP.PCM.PN
Subjective





- Date & Time of Evaluation


Date of Evaluation: 10/05/18


Time of Evaluation: 09:00





- Subjective


Subjective: 


PGY-1 note for Dr Chaves





Patient is seen and examined at bedside. Patient complains of not having been 

able to get some sleep over 24 hours, which says is keeping him in the current 

state he is. Patient continues to complain of chest pain on left side of 

anterior chest cavity, and right shoulder area. Patient describes it as crampy, 

constant pain.  Patient denies shortness of breath. Patient feels very sleeping 

at this time. Patient denies fever, chills, nausea, vomiting, diarrhea, 

constipation, abdominal pain, or dysuria. 








Objective





- Vital Signs/Intake and Output


Vital Signs (last 24 hours): 


                                        











Temp Pulse Resp BP Pulse Ox


 


 98.0 F   81   20   163/101 H  97 


 


 10/05/18 07:00  10/05/18 08:00  10/05/18 07:00  10/05/18 08:03  10/05/18 07:00








Intake and Output: 


                                        











 10/05/18 10/05/18





 06:59 18:59


 


Output Total 300 


 


Balance -300 














- Medications


Medications: 


                               Current Medications





Acetaminophen (Tylenol 325mg Tab)  650 mg PO Q6 PRN


   PRN Reason: Headache


   Last Admin: 10/04/18 22:13 Dose:  650 mg


Aspirin (Aspirin Chewable)  81 mg PO DAILY [facility]


   Last Admin: 10/05/18 09:33 Dose:  81 mg


Carvedilol (Coreg)  3.125 mg PO BID [facility]


   Last Admin: 10/05/18 08:01 Dose:  3.125 mg


Enoxaparin Sodium (Lovenox)  40 mg SC DAILY [facility]


   Last Admin: 10/05/18 09:33 Dose:  40 mg


Hydrochlorothiazide (Microzide)  12.5 mg PO DAILY [facility]


   Last Admin: 10/05/18 08:00 Dose:  12.5 mg


Influenza Virus Vaccine (Fluzone Quad [id number])  60 mcg IM .ONCE ONE


   Stop: 10/06/18 12:01


Lisinopril (Zestril)  10 mg PO DAILY [facility]


   Last Admin: 10/05/18 08:01 Dose:  10 mg


Pantoprazole Sodium (Protonix Ec Tab)  40 mg PO DAILY [facility]


Pneumococcal Polyvalent Vaccine (Pneumovax 23 Vaccine)  0.5 ml IM .ONCE ONE


   Stop: 10/06/18 12:01


Rosuvastatin Calcium (Crestor)  5 mg PO HS [facility]











- Labs


Labs: 


                                        





                                 10/04/18 15:59 





                                 10/04/18 15:59 





                                        











PT  12.0 SECONDS (9.7-12.2)   10/04/18  16:55    


 


INR  1.1   10/04/18  16:55    


 


APTT  35 SECONDS (21-34)  H  10/04/18  16:55    














- Constitutional


Appears: Non-toxic, No Acute Distress





- Head Exam


Head Exam: ATRAUMATIC, NORMAL INSPECTION, NORMOCEPHALIC





- Eye Exam


Eye Exam: EOMI, Normal appearance





- ENT Exam


ENT Exam: Mucous Membranes Moist, Normal Exam





- Neck Exam


Neck Exam: Full ROM, Normal Inspection





- Respiratory Exam


Respiratory Exam: Clear to Ausculation Bilateral, NORMAL BREATHING PATTERN.  

absent: Accessory Muscle Use, Respiratory Distress





- Cardiovascular Exam


Cardiovascular Exam: REGULAR RHYTHM, +S1, +S2





- GI/Abdominal Exam


GI & Abdominal Exam: Soft, Normal Bowel Sounds.  absent: Distended, Guarding, 

Tenderness





- Extremities Exam


Extremities Exam: Full ROM, Normal Inspection





- Back Exam


Back Exam: NORMAL INSPECTION





- Neurological Exam


Neurological Exam: Alert, Awake, Oriented x3





- Psychiatric Exam


Psychiatric exam: Normal Affect, Normal Mood





- Skin


Skin Exam: Dry, Intact, Normal Color, Warm





Assessment and Plan





- Assessment and Plan (Free Text)


Plan: 


Dyspnea likely secondary to new onset CHF


-Stable, afebrile


-EKG showed LVH with inverted T waves


-Initial Troponin negative, Trend MARGE Q8H x 2


-CXR showed Cardiomegaly with increased pulmonary vascular congestion (see full 

report)


-S/P Lasix 20mg IVP x 1


-Echocardiogram ordered - f/u official report


-Continue Lisinopril 10mg daily, Coreg 3.125mg PO BID


-Continue Lasix 20mg IVP daily


-Daily weights, Intake and output


-Cardiology on consult, Dr Rocha, help appreciated





Chest pain, r/o ACS


-EKG showed LVH with inverted T waves


-Troponin negative x 3


-F/U TSH/Free T4, Lipid panel, Hemoglobin A1C


-Cardiology on consult, Dr Rocha, help appreciated





Hypertension


-Started on Lisinopril 10mg PO daily, Coreg 3.125mg PO BID


-increased HCTZ from 12.5mg to 25 mg PO daily 


-Monitor Vitals





Renal Cysts


-Up to 6.6cm renal cyst noted on abdominal US


-F/U CT abd/pelvis results - done today





Tobacco abuse


-Cessation encouraged





GI/DVT ppx:


-Protonix 40mg PO daily


-Lovenox 40mg SC daily


-SCDS contraindicated for CHF


-Heart healthy diet 





Plan discussed with Dr Andie Diaz, PGY01

## 2018-10-06 LAB
ALBUMIN SERPL-MCNC: 4.2 G/DL (ref 3.5–5)
ALBUMIN/GLOB SERPL: 1.3 {RATIO} (ref 1–2.1)
ALT SERPL-CCNC: 18 U/L (ref 21–72)
AST SERPL-CCNC: 23 U/L (ref 17–59)
BASOPHILS # BLD AUTO: 0 K/UL (ref 0–0.2)
BASOPHILS NFR BLD: 0.7 % (ref 0–2)
BUN SERPL-MCNC: 18 MG/DL (ref 9–20)
CALCIUM SERPL-MCNC: 9.6 MG/DL (ref 8.6–10.4)
EOSINOPHIL # BLD AUTO: 0.1 K/UL (ref 0–0.7)
EOSINOPHIL NFR BLD: 2 % (ref 0–4)
ERYTHROCYTE [DISTWIDTH] IN BLOOD BY AUTOMATED COUNT: 14 % (ref 11.5–14.5)
GFR NON-AFRICAN AMERICAN: > 60
HGB BLD-MCNC: 15.7 G/DL (ref 12–18)
LYMPHOCYTES # BLD AUTO: 1.4 K/UL (ref 1–4.3)
LYMPHOCYTES NFR BLD AUTO: 26 % (ref 20–40)
MCH RBC QN AUTO: 31.4 PG (ref 27–31)
MCHC RBC AUTO-ENTMCNC: 33.7 G/DL (ref 33–37)
MCV RBC AUTO: 93.3 FL (ref 80–94)
MONOCYTES # BLD: 0.5 K/UL (ref 0–0.8)
MONOCYTES NFR BLD: 9.1 % (ref 0–10)
NEUTROPHILS # BLD: 3.4 K/UL (ref 1.8–7)
NEUTROPHILS NFR BLD AUTO: 62.2 % (ref 50–75)
NRBC BLD AUTO-RTO: 0.1 % (ref 0–2)
PLATELET # BLD: 214 K/UL (ref 130–400)
PMV BLD AUTO: 9.8 FL (ref 7.2–11.7)
RBC # BLD AUTO: 5.01 MIL/UL (ref 4.4–5.9)
WBC # BLD AUTO: 5.4 K/UL (ref 4.8–10.8)

## 2018-10-06 RX ADMIN — PANTOPRAZOLE SODIUM SCH MG: 40 TABLET, DELAYED RELEASE ORAL at 09:37

## 2018-10-06 RX ADMIN — ENOXAPARIN SODIUM SCH: 40 INJECTION SUBCUTANEOUS at 10:46

## 2018-10-06 RX ADMIN — ENOXAPARIN SODIUM SCH MG: 40 INJECTION SUBCUTANEOUS at 09:37

## 2018-10-06 NOTE — CP.PCM.PN
Subjective





- Date & Time of Evaluation


Date of Evaluation: 10/06/18


Time of Evaluation: 09:30





- Subjective


Subjective: 


PGY-1 note for Hospitalist Dr Goyal





Patient seen and examined at bedside. Patient states chest pain is improving. 

patient complains of having pressure from belly pressing into his chest area. 

Patient admit to urinating a lot. patient denies fever, chills, shortness of 

breath, nausea, vomiting, diarrhea or constipation. Patient is out of bed and 

tolerates diet. 








Objective





- Vital Signs/Intake and Output


Vital Signs (last 24 hours): 


                                        











Temp Pulse Resp BP Pulse Ox


 


 98.3 F   72   20   146/94 H  96 


 


 10/06/18 15:00  10/06/18 15:00  10/06/18 15:00  10/06/18 15:00  10/06/18 15:00








Intake and Output: 


                                        











 10/06/18 10/06/18





 06:59 18:59


 


Intake Total 480 600


 


Output Total  400


 


Balance 480 200














- Medications


Medications: 


                               Current Medications





Acetaminophen (Tylenol 325mg Tab)  650 mg PO Q6 PRN


   PRN Reason: Headache


   Last Admin: 10/04/18 22:13 Dose:  650 mg


Aspirin (Aspirin Chewable)  81 mg PO DAILY [facility]


   Last Admin: 10/06/18 09:37 Dose:  81 mg


Carvedilol (Coreg)  6.25 mg PO BID [facility]


   Last Admin: 10/06/18 17:49 Dose:  6.25 mg


Enoxaparin Sodium (Lovenox)  40 mg SC DAILY [facility]


   Last Admin: 10/06/18 09:37 Dose:  40 mg


Furosemide (Lasix)  40 mg IVP Q24H [facility]


   Last Admin: 10/06/18 13:36 Dose:  40 mg


Lisinopril (Zestril)  10 mg PO DAILY [facility]


   Last Admin: 10/06/18 09:37 Dose:  10 mg


Nicotine (Nicoderm Cq)  1 patch TD DAILY [facility]


Pantoprazole Sodium (Protonix Ec Tab)  40 mg PO DAILY [facility]


   Last Admin: 10/06/18 09:37 Dose:  40 mg


Rosuvastatin Calcium (Crestor)  5 mg PO HS [facility]


   Last Admin: 10/05/18 22:00 Dose:  5 mg











- Labs


Labs: 


                                        





                                 10/06/18 11:56 





                                 10/06/18 11:56 





                                        











PT  12.0 SECONDS (9.7-12.2)   10/04/18  16:55    


 


INR  1.1   10/04/18  16:55    


 


APTT  35 SECONDS (21-34)  H  10/04/18  16:55    














- Constitutional


Appears: Well, Non-toxic, No Acute Distress





- Head Exam


Head Exam: ATRAUMATIC, NORMAL INSPECTION, NORMOCEPHALIC





- Eye Exam


Eye Exam: EOMI, Normal appearance





- ENT Exam


ENT Exam: Mucous Membranes Moist, Normal Exam





- Neck Exam


Neck Exam: Full ROM, Normal Inspection





- Respiratory Exam


Respiratory Exam: Clear to Ausculation Bilateral, NORMAL BREATHING PATTERN





- Cardiovascular Exam


Cardiovascular Exam: REGULAR RHYTHM, +S1, +S2





- GI/Abdominal Exam


GI & Abdominal Exam: Distended, Soft, Normal Bowel Sounds.  absent: Guarding, 

Tenderness





- Extremities Exam


Extremities Exam: Full ROM, Normal Inspection.  absent: Calf Tenderness, 

Tenderness





- Back Exam


Back Exam: NORMAL INSPECTION





- Neurological Exam


Neurological Exam: Alert, Awake, Oriented x3





- Psychiatric Exam


Psychiatric exam: Normal Affect, Normal Mood





- Skin


Skin Exam: Dry, Intact, Warm





Assessment and Plan





- Assessment and Plan (Free Text)


Plan: 


Dyspnea likely secondary to new onset CHF


-Stable, afebrile


-EKG showed LVH with inverted T waves


-Initial Troponin negative, Trend MARGE Q8H x 2


-CXR showed Cardiomegaly with increased pulmonary vascular congestion (see full 

report)


-Echocardiogram ordered - f/u official report


-Continue Lisinopril 10mg daily, 


-Coreg increased to  6.25mg PO BID


-Lasix 40mg IVP daily


-Daily weights, Intake and output


-Cardiology on consult, Dr Rocha, help appreciated


- Duonebs Q6hr PRN 





Chest pain


-EKG showed LVH with inverted T waves


-Troponin negative x 3


-F/U TSH/Free T4, Lipid panel, Hemoglobin A1C


-Cardiology on consult, Dr Rocha, help appreciated





Hypertension


-Started on Lisinopril 10mg PO daily, Coreg 6.25mg PO BID


-d/c HCTZ


-Monitor Vitals





Renal Cysts


-Up to 6.6cm renal cyst noted on abdominal US


-F/U CT abd/pelvis results - pending official report





Tobacco abuse


-Cessation encouraged


- Nicotine 14mg/24hrs patch 





GI/DVT ppx:


-Protonix 40mg PO daily


-Lovenox 40mg SC daily


-SCDS contraindicated for CHF


-Heart healthy diet 





Plan discussed with Dr Jay Jay Diaz

## 2018-10-06 NOTE — CP.PCM.CON
History of Present Illness





- History of Present Illness


History of Present Illness: 





CC: Progressive Dyspnea on exertion, chest pain





HPI: Patient is a 58 year old  male with past medical history of

Hypertension who presented to the emergency department for worsening dyspnea and

chest pain. Patient states that he has been having increasing dyspnea on 

exertion for the past 2-3 months. He states that he is able to walk up 1 flight 

of stairs before having to stop due to shortness of breath. He states that he 

sleeps with one pillow at night. Patient states that chest pain started a few 

months ago as well. It is intermittent in nature and he believes is associated 

with shortness of breath. Chest pain is left sided and does not radiate. It was 

the most severe last night when he was laying down. Describes the pain as 

concurrent. He denies any alleviating or exacerbating factors. Not associated 

with movement. Denies diaphoresis, nausea, vomiting. He state that he fell 

walking down a flight of steps 3 months ago. He was not having chest pain or 

shortness of breath at that time. Admits to fatigue and not being able to sleep 

at night. He is unable to explain why he cannot sleep at night. Denies fevers, 

chills, nausea/vomiting, recent travel, sick contacts, headaches, dizziness, 

cough, palpitations, abdominal pain, orthopnea, lower extremity swelling, 

urinary symptoms, changes in bowel habits, hematuria. 








ED Course: Lasix 20mg IVP, Vastec 40mg PO x 1, Nitro 2%





Allergies: NKDA


Medications: HCTZ 12.5mg PO daily (ran out months ago)


Medical History: Hypertension


Surgical History: Denies


Social History: Smokes 1 pack of cigarettes every 3 days, drinks 6 pack of beer 

on weekends occasionally, denies drug use; works as contractor, lives alone


Family History: Mother - brain tumor, CVA in 70s, Father - CVA in 80s, Brother -

Kidney cancer (), Brother - Leg cancer





Present on Admission





- Present on Admission


Any Indicators Present on Admission: No





Past Patient History





- Past Social History


Smoking Status: Light Smoker < 10 Cigarettes Daily





- CARDIAC


Hx Hypertension: Yes





- PSYCHIATRIC


Hx Substance Use: No





- SURGICAL HISTORY


Hx Surgeries: No





- ANESTHESIA


Hx Anesthesia: No





Meds


Allergies/Adverse Reactions: 


                                    Allergies











Allergy/AdvReac Type Severity Reaction Status Date / Time


 


No Known Allergies Allergy   Verified 10/04/18 15:03














Physical Exam





- Constitutional


Appears: Non-toxic, No Acute Distress





- Head Exam


Head Exam: ATRAUMATIC, NORMAL INSPECTION, NORMOCEPHALIC





- Eye Exam


Eye Exam: EOMI, Normal appearance


Pupil Exam: NORMAL ACCOMODATION





- ENT Exam


ENT Exam: Mucous Membranes Moist





- Neck Exam


Neck exam: Positive for: Full Rom.  Negative for: Tenderness





- Respiratory Exam


Respiratory Exam: Decreased Breath Sounds, NORMAL BREATHING PATTERN.  absent: 

Rales, Rhonchi, Wheezes





- Cardiovascular Exam


Cardiovascular Exam: REGULAR RHYTHM, +S1, +S2.  absent: Systolic Murmur





- GI/Abdominal Exam


GI & Abdominal Exam: Distended, Normal Bowel Sounds, Soft.  absent: Guarding, 

Hernia, Rebound, Rigid, Tenderness





- Extremities Exam


Extremities exam: Positive for: normal inspection, pedal pulses present.  

Negative for: calf tenderness, joint swelling, pedal edema, tenderness





- Back Exam


Back exam: CVA tenderness (R), NORMAL INSPECTION





- Neurological Exam


Neurological exam: Alert, Oriented x3





- Psychiatric Exam


Psychiatric exam: Normal Affect, Normal Mood





- Skin


Skin Exam: Dry, Normal Color, Warm





Results





- Vital Signs


Recent Vital Signs: 





                                Last Vital Signs











Temp  97.7 F   10/04/18 15:05


 


Pulse  81   10/04/18 18:44


 


Resp  19   10/04/18 18:44


 


BP  151/91 H  10/04/18 18:44


 


Pulse Ox  96   10/04/18 18:44














- Labs


Result Diagrams: 


                                 10/04/18 15:59





                                 10/04/18 15:59


Labs: 





                         Laboratory Results - last 24 hr











  10/04/18 10/04/18 10/04/18





  15:59 15:59 16:55


 


WBC  5.5  


 


RBC  4.82  


 


Hgb  15.3  


 


Hct  44.6  


 


MCV  92.5  


 


MCH  31.8 H  


 


MCHC  34.4  


 


RDW  14.4  


 


Plt Count  204  


 


MPV  9.6  


 


Neut % (Auto)  64.7  


 


Lymph % (Auto)  22.8  


 


Mono % (Auto)  10.3 H  


 


Eos % (Auto)  1.2  


 


Baso % (Auto)  1.0  


 


Neut # (Auto)  3.5  


 


Lymph # (Auto)  1.2  


 


Mono # (Auto)  0.6  


 


Eos # (Auto)  0.1  


 


Baso # (Auto)  0.1  


 


PT    12.0


 


INR    1.1


 


APTT    35 H


 


D-Dimer, Quantitative    470 H


 


Sodium   142 


 


Potassium   5.1 


 


Chloride   109 H 


 


Carbon Dioxide   23 


 


Anion Gap   15 


 


BUN   12 


 


Creatinine   1.0 


 


Est GFR ( Amer)   > 60 


 


Est GFR (Non-Af Amer)   > 60 


 


Random Glucose   98 


 


Calcium   8.8 


 


Total Bilirubin   1.1 


 


AST   36 


 


ALT   22 


 


Alkaline Phosphatase   61 


 


Troponin I   0.0680 


 


NT-Pro-B Natriuret Pep   3710 H 


 


Total Protein   7.1 


 


Albumin   3.7 


 


Globulin   3.4 


 


Albumin/Globulin Ratio   1.1 


 


Urine Color   


 


Urine Clarity   


 


Urine pH   


 


Ur Specific Gravity   


 


Urine Protein   


 


Urine Glucose (UA)   


 


Urine Ketones   


 


Urine Blood   


 


Urine Nitrate   


 


Urine Bilirubin   


 


Urine Urobilinogen   


 


Ur Leukocyte Esterase   


 


Urine WBC (Auto)   


 


Urine Bacteria   


 


Urine Opiates Screen   


 


Urine Methadone Screen   


 


Ur Barbiturates Screen   


 


Ur Phencyclidine Scrn   


 


Ur Amphetamines Screen   


 


U Benzodiazepines Scrn   


 


U Oth Cocaine Metabols   


 


U Cannabinoids Screen   














  10/04/18 10/04/18





  17:11 17:11


 


WBC  


 


RBC  


 


Hgb  


 


Hct  


 


MCV  


 


MCH  


 


MCHC  


 


RDW  


 


Plt Count  


 


MPV  


 


Neut % (Auto)  


 


Lymph % (Auto)  


 


Mono % (Auto)  


 


Eos % (Auto)  


 


Baso % (Auto)  


 


Neut # (Auto)  


 


Lymph # (Auto)  


 


Mono # (Auto)  


 


Eos # (Auto)  


 


Baso # (Auto)  


 


PT  


 


INR  


 


APTT  


 


D-Dimer, Quantitative  


 


Sodium  


 


Potassium  


 


Chloride  


 


Carbon Dioxide  


 


Anion Gap  


 


BUN  


 


Creatinine  


 


Est GFR ( Amer)  


 


Est GFR (Non-Af Amer)  


 


Random Glucose  


 


Calcium  


 


Total Bilirubin  


 


AST  


 


ALT  


 


Alkaline Phosphatase  


 


Troponin I  


 


NT-Pro-B Natriuret Pep  


 


Total Protein  


 


Albumin  


 


Globulin  


 


Albumin/Globulin Ratio  


 


Urine Color  Yellow 


 


Urine Clarity  Clear 


 


Urine pH  6.0 


 


Ur Specific Gravity  1.004 


 


Urine Protein  Negative 


 


Urine Glucose (UA)  Normal 


 


Urine Ketones  Negative 


 


Urine Blood  Negative 


 


Urine Nitrate  Negative 


 


Urine Bilirubin  Negative 


 


Urine Urobilinogen  Normal 


 


Ur Leukocyte Esterase  Neg 


 


Urine WBC (Auto)  < 1 


 


Urine Bacteria  Rare 


 


Urine Opiates Screen   Negative


 


Urine Methadone Screen   Negative


 


Ur Barbiturates Screen   Negative


 


Ur Phencyclidine Scrn   Negative


 


Ur Amphetamines Screen   Negative


 


U Benzodiazepines Scrn   Negative


 


U Oth Cocaine Metabols   Negative


 


U Cannabinoids Screen   Negative














Assessment & Plan





- Assessment and Plan (Free Text)


Assessment: 





A/P: Patient is a 58 year old  male with past medical history of

Hypertension who presented to the Emergency Dept for worsening dyspnea and left 

sided chest pain.





Dyspnea likely secondary to new onset CHF


-Stable, afebrile


-EKG showed LVH with inverted T waves


-Initial Troponin negative, Trend MARGE Q8H x 2


-CXR showed Cardiomegaly with increased pulmonary vascular congestion (see full 

report)


-S/P Lasix 20mg IVP x 1


-Echocardiogram ordered


-Start on Lisinopril 10mg daily, Coreg 3.125mg PO BID


-Continue Lasix 20mg IVP daily


-Daily weights, Intake and output


-Cardiology on consult, Dr Rocha, help appreciated





Chest pain, r/o ACS


-EKG showed LVH with inverted T waves


-Initial Troponin negative, Trend MARGE Q8H x 2


-F/U TSH/Free T4, Lipid panel, Hemoglobin A1C


-Cardiology on consult, Dr Rocha, help appreciated





Hypertension


-Started on Lisinopril 10mg PO daily, Coreg 3.125mg PO BID


-Monitor Vitals





Renal Cysts


-Up to 6.6cm renal cyst noted on abdominal US


-Will order CT abd/pelvis for further evaluation





Tobacco abuse


-Cessation encouraged





GI/DVT ppx:


-Protonix 40mg PO daily


-Lovenox 40mg SC daily














Systolic CHF


HTN





Mosl likely hypertensive Cardiomyopathy


Cath Monday r/o CAD


Aggressive systolic CHF management





Past Patient History





- Past Medical History & Family History


Past Medical History?: Yes





- Past Social History


Smoking Status: Light Smoker < 10 Cigarettes Daily





- CARDIAC


Hx Hypertension: Yes





- MUSCULOSKELETAL/RHEUMATOLOGICAL


Hx Falls: No





- PSYCHIATRIC


Hx Substance Use: No





- SURGICAL HISTORY


Hx Surgeries: No





- ANESTHESIA


Hx Anesthesia: No





Meds


Allergies/Adverse Reactions: 


                                    Allergies











Allergy/AdvReac Type Severity Reaction Status Date / Time


 


No Known Allergies Allergy   Verified 10/04/18 15:03














- Medications


Medications: 


                               Current Medications





Acetaminophen (Tylenol 325mg Tab)  650 mg PO Q6 PRN


   PRN Reason: Headache


   Last Admin: 10/04/18 22:13 Dose:  650 mg


Albuterol/Ipratropium (Duoneb 3 Mg/0.5 Mg (3 Ml) Ud)  3 ml INH RQ6 PRN


   PRN Reason: Shortness of Breath


Aspirin (Aspirin Chewable)  81 mg PO DAILY [facility]


   Last Admin: 10/06/18 09:37 Dose:  81 mg


Carvedilol (Coreg)  6.25 mg PO BID [facility]


   Last Admin: 10/06/18 17:49 Dose:  6.25 mg


Enoxaparin Sodium (Lovenox)  40 mg SC DAILY [facility]


   Last Admin: 10/06/18 09:37 Dose:  40 mg


Furosemide (Lasix)  40 mg IVP Q24H [facility]


   Last Admin: 10/06/18 13:36 Dose:  40 mg


Lisinopril (Zestril)  10 mg PO DAILY [facility]


   Last Admin: 10/06/18 09:37 Dose:  10 mg


Nicotine (Nicoderm Cq)  1 patch TD DAILY [facility]


Pantoprazole Sodium (Protonix Ec Tab)  40 mg PO DAILY [facility]


   Last Admin: 10/06/18 09:37 Dose:  40 mg


Rosuvastatin Calcium (Crestor)  5 mg PO HS [facility]


   Last Admin: 10/06/18 22:14 Dose:  5 mg











Results





- Vital Signs


Recent Vital Signs: 


                                Last Vital Signs











Temp  98.3 F   10/06/18 15:00


 


Pulse  72   10/06/18 15:00


 


Resp  20   10/06/18 15:00


 


BP  146/94 H  10/06/18 15:00


 


Pulse Ox  96   10/06/18 15:00














- Labs


Result Diagrams: 


                                 10/06/18 11:56





                                 10/06/18 11:56


Labs: 


                         Laboratory Results - last 24 hr











  10/06/18 10/06/18





  11:56 11:56


 


WBC  5.4 


 


RBC  5.01 


 


Hgb  15.7 


 


Hct  46.7 


 


MCV  93.3 


 


MCH  31.4 H 


 


MCHC  33.7 


 


RDW  14.0 


 


Plt Count  214 


 


MPV  9.8 


 


Neut % (Auto)  62.2 


 


Lymph % (Auto)  26.0 


 


Mono % (Auto)  9.1 


 


Eos % (Auto)  2.0 


 


Baso % (Auto)  0.7 


 


Neut # (Auto)  3.4 


 


Lymph # (Auto)  1.4 


 


Mono # (Auto)  0.5 


 


Eos # (Auto)  0.1 


 


Baso # (Auto)  0.0 


 


Sodium   137


 


Potassium   4.2


 


Chloride   105


 


Carbon Dioxide   26


 


Anion Gap   11


 


BUN   18


 


Creatinine   1.0


 


Est GFR ( Amer)   > 60


 


Est GFR (Non-Af Amer)   > 60


 


Random Glucose   114 H


 


Calcium   9.6


 


Phosphorus   4.2


 


Magnesium   1.9


 


Total Bilirubin   0.7


 


AST   23


 


ALT   18 L


 


Alkaline Phosphatase   77


 


Total Protein   7.4


 


Albumin   4.2


 


Globulin   3.2


 


Albumin/Globulin Ratio   1.3

## 2018-10-06 NOTE — CT
Date of service: 



10/05/2018



PROCEDURE:  CT Abdomen and Pelvis..



HISTORY:

Renal cyst



COMPARISON:

Comparison made with abdominal ultrasound 10/04/2018



TECHNIQUE:

Contiguous axial images of the abdomen and pelvis performed with oral 

contrast.  IV contrast not injected per request coronal and Sagittal 

reformats generated. 



Radiation dose:



Total exam DLP = 1055.34 mGy-cm.



This CT exam was performed using one or more of the following dose 

reduction techniques: Automated exposure control, adjustment of the 

mA and/or kV according to patient size, and/or use of iterative 

reconstruction technique.



FINDINGS:



LOWER THORAX:

Heart is enlarged.  No significant pericardial effusion 



Tiny hiatal hernia. 



Small right-sided effusion with minimal right basilar atelectasis.  

Right lung base clear. 



LIVER:

Unremarkable. No gross lesion or ductal dilatation.



GALLBLADDER AND BILE DUCTS:

Gallbladder incompletely distended.  No evidence of intraluminal 

gallbladder calculi. 



PANCREAS:

Unremarkable. No mass. No ductal dilatation.



SPLEEN:

Unremarkable. No splenomegaly. 



ADRENALS:

Slightly nodular appearing adrenal glands bilaterally which exhibit 

on Hounsfield units in the lower teens and negative single digits on 

the left side and right side respectively.  Findings likely represent 

small adenomas..  Consider follow-up MRI of the adrenal glands.  



KIDNEYS AND URETERS:

There are multiple on cystic foci left kidney the largest of which is 

anteriorly located and exophytic arising from the lower pole 

measuring approximately 6.8 cm. 



BLADDER:

Urinary bladder incompletely distended which in part accounts for 

thick-walled appearance.  Muscular hypertrophy presumably 

contributes.  Correlation with urinalysis could be performed to 

exclude other a cystitis however other intrinsic/invasive wall lesion 

not excluded. 



REPRODUCTIVE:

Prostate gland measures approximately 3.7 cm in transverse dimension. 



APPENDIX:

No evidence of acute appendicitis. 



BOWEL:

Evaluation of the bowel is somewhat limited due to incomplete 

opacification..  Stomach partially distended with food debris liquid 

and air.  Visualized small bowel exhibit normal contour and caliber.  

No evidence of acute mechanical small bowel obstruction with oral 

contrast material seen extending into the colon to the level of the 

rectum.



There are scattered colonic diverticula seen along the sigmoid colon 

however no radiographic evidence of acute diverticulitis.  No 

definitive mural wall thickening of the colon.. 



PERITONEUM:

Unremarkable. No fluid collection. No free air.  Small fat containing 

bilateral inguinal hernias. 



LYMPH NODES:

Unremarkable. No enlarged lymph nodes. 



VASCULATURE:

Unremarkable. No aortic aneurysm. 



BONES:

Mild multilevel degenerative spondylosis of the lower thoracic and 

lumbar spine. 



OTHER FINDINGS:

None. 



IMPRESSION:

Multiple left-sided renal cysts the largest measuring 6.8 cm as 

described.



Small right-sided effusion with minor right basilar atelectasis.



Cardiomegaly.



Few scattered colonic diverticula without radiographic evidence of 

acute diverticulitis.



Slightly nodular appearing adrenal glands possibly representing small 

adenomas.



Cardiomegaly.

## 2018-10-07 LAB
ALBUMIN SERPL-MCNC: 3.8 G/DL (ref 3.5–5)
ALBUMIN/GLOB SERPL: 1.2 {RATIO} (ref 1–2.1)
ALT SERPL-CCNC: 15 U/L (ref 21–72)
AST SERPL-CCNC: 24 U/L (ref 17–59)
BASOPHILS # BLD AUTO: 0.1 K/UL (ref 0–0.2)
BASOPHILS NFR BLD: 0.9 % (ref 0–2)
BUN SERPL-MCNC: 24 MG/DL (ref 9–20)
CALCIUM SERPL-MCNC: 9 MG/DL (ref 8.6–10.4)
EOSINOPHIL # BLD AUTO: 0.1 K/UL (ref 0–0.7)
EOSINOPHIL NFR BLD: 1.7 % (ref 0–4)
ERYTHROCYTE [DISTWIDTH] IN BLOOD BY AUTOMATED COUNT: 14 % (ref 11.5–14.5)
GFR NON-AFRICAN AMERICAN: > 60
HGB BLD-MCNC: 16.5 G/DL (ref 12–18)
LYMPHOCYTES # BLD AUTO: 1.2 K/UL (ref 1–4.3)
LYMPHOCYTES NFR BLD AUTO: 19.7 % (ref 20–40)
MCH RBC QN AUTO: 31.8 PG (ref 27–31)
MCHC RBC AUTO-ENTMCNC: 34.1 G/DL (ref 33–37)
MCV RBC AUTO: 93.3 FL (ref 80–94)
MONOCYTES # BLD: 0.4 K/UL (ref 0–0.8)
MONOCYTES NFR BLD: 6.8 % (ref 0–10)
NEUTROPHILS # BLD: 4.4 K/UL (ref 1.8–7)
NEUTROPHILS NFR BLD AUTO: 70.9 % (ref 50–75)
NRBC BLD AUTO-RTO: 0 % (ref 0–2)
PLATELET # BLD: 203 K/UL (ref 130–400)
PMV BLD AUTO: 9.9 FL (ref 7.2–11.7)
RBC # BLD AUTO: 5.2 MIL/UL (ref 4.4–5.9)
WBC # BLD AUTO: 6.2 K/UL (ref 4.8–10.8)

## 2018-10-07 RX ADMIN — ENOXAPARIN SODIUM SCH: 40 INJECTION SUBCUTANEOUS at 09:30

## 2018-10-07 RX ADMIN — PANTOPRAZOLE SODIUM SCH MG: 40 TABLET, DELAYED RELEASE ORAL at 09:29

## 2018-10-07 NOTE — CARD
--------------- APPROVED REPORT --------------





Date of service: 10/05/2018



EXAM: Two-dimensional and M-mode echocardiogram with Doppler and 

color Doppler.



Other Information 

Quality : GoodRhythm : 



INDICATION

Dyspnea Congestive Heart Failure 



Echo Enhancing Agent

Indication: Endocardial border delineation

Agent/Amount Used: Definity



RISK FACTORS

Hypertension 



2D DIMENSIONS 

IVSd1.5   (0.7-1.1cm)LVDd6.4   (3.9-5.9cm)

PWd1.5   (0.7-1.1cm)LA Iebzzw147   (18-58mL)

LVDs5.7   (2.5-4.0cm)FS (%) 10.3   %

LVEF (%)22.1   (>50%)LVEF (Alexander's)23.89 %



M-Mode DIMENSIONS 

Left Atrium (MM)4.71   (2.5-4.0cm)IVSd1.71   (0.7-1.1cm)

Aortic Root3.79   (2.2-3.7cm)LVDd6.32   (4.0-5.6cm)

Aortic Cusp Exc.2.54   (1.5-2.0cm)PWd1.65   (0.7-1.1cm)

FS (%) 11   %LVDs5.64   (2.0-3.8cm)

LVEF (%)23   (>50%)



Mitral Valve

MV E Qjzqwjna37.3cm/sMV A Oiqglorz28.1cm/sE/A ratio2.8

OZOI684.73 cm/s



TDI

Lateral E' Peak V5.00cm/sMedial E' Peak V2.80cm/sE/Lateral E'17.7

E/Medial E'31.5



Tricuspid Valve

TR Peak Lebzlity319ee/sTR Peak Gr.15jdQuZKGW31ycJp



 LEFT VENTRICLE 

The Left Ventricle is moderately dilated.

There is mild to moderate concentric left ventricular hypertrophy.

Left ventricle systolic function is severely impaired.

The Ejection Fraction is 20-25%.

There is global hypokinesis of the left ventricle.

The left ventricular diastolic function is normal.

Apical echoes consistent with trabeculae are noted.  Cannot rule out 

associated thrombi.

Definity contrast study revealed a less dense area in the apex as 

well. Suggest JUAN F to rule out thrombus.



 RIGHT VENTRICLE 

The right ventricle is mildly to moderately dilated.

There is normal right ventricular wall thickness.

Systolic function is severely reduced.



 ATRIA 

The left atrium is moderately dilated.

The right atrium is moderately dilated.

The interatrial septum is intact with no evidence for an atrial 

septal defect.



 AORTIC VALVE 

The aortic valve is normal in structure.

No aortic regurgitation is present.

There is no aortic valvular stenosis. 

There is no aortic valvular vegetation.



 MITRAL VALVE 

The mitral valve is normal in structure.

There is no evidence of mitral valve prolapse.

There is no mitral valve stenosis.

Mitral regurgitation is mild.



 TRICUSPID VALVE 

The tricuspid valve is normal in structure.

There is mild tricuspid regurgitation.

Right ventricular systolic pressure is estimated at 40-50 mmHg. 

There is mild-moderate pulmonary hypertension.



 PULMONIC VALVE 

The pulmonic valve is not well visualized.

There is mild pulmonic valvular regurgitation. 



 GREAT VESSELS 

The aortic root is normal in size.



<Conclusion>

Left ventricle systolic function is severely impaired.

The Ejection Fraction is 20-25%.

Apical echoes consistent with trabeculae are noted.  Cannot rule out 

associated thrombi.

Definity contrast study revealed a less dense area in the apex as 

well.  Suggest JUAN F to rule out thrombus.

No aortic regurgitation is present.

Mitral regurgitation is mild.

There is mild tricuspid regurgitation.

There is mild-moderate pulmonary hypertension.

There is mild pulmonic valvular regurgitation.

## 2018-10-07 NOTE — CP.PCM.PN
Subjective





- Date & Time of Evaluation


Date of Evaluation: 10/07/18


Time of Evaluation: 12:00





- Subjective


Subjective: 





hospitalist note:





Patient seen, examined, case discussed with medical resident.


Patient denies headache, reports dyspnea on exertion and has been improving, 

denies cough denies chest pain denies abdominal pain denies nausea denies vomi

ting denies dysuria denies constipation. Patient reports for the past 3 months 

he's been taking Viagra for erectile dysfunction issues. Patient also reports a 

history of being  for 8 years at [facility] in the [facility] 

patient reports he is familiar with a cardiac cath however he is quite hesitant 

to pursue that option. I had a discussion with him in regards to the cardiac 

cath would allow the interventional cardiology to assess the coronary arteries 

which feed the heart to determine if he has was called ischemic cardiomyopathy. 

We did have abated discussion as well in regards to his hypertension and his 

noncompliance with his medications which are just a strong risk factor for his 

heart disease. We also had a quite big discussion about smoking uses well which 

will take a toll both on the lungs and the heart which he is also well aware 





Objective





- Vital Signs/Intake and Output


Vital Signs (last 24 hours): 


                                        











Temp Pulse Resp BP Pulse Ox


 


 97.5 F L  80   20   156/104 H  98 


 


 10/07/18 15:00  10/07/18 16:39  10/07/18 15:00  10/07/18 17:34  10/07/18 15:00








Intake and Output: 


                                        











 10/07/18 10/08/18





 18:59 06:59


 


Intake Total 600 


 


Output Total 400 1100


 


Balance 200 -1100














- Medications


Medications: 


                               Current Medications





Acetaminophen (Tylenol 325mg Tab)  650 mg PO Q6 PRN


   PRN Reason: Headache


   Last Admin: 10/04/18 22:13 Dose:  650 mg


Albuterol/Ipratropium (Duoneb 3 Mg/0.5 Mg (3 Ml) Ud)  3 ml INH RQ6 PRN


   PRN Reason: Shortness of Breath


Aspirin (Aspirin Chewable)  81 mg PO DAILY [facility]


   Last Admin: 10/07/18 09:29 Dose:  81 mg


Carvedilol (Coreg)  12.5 mg PO BID [facility]


   Last Admin: 10/07/18 17:34 Dose:  12.5 mg


Enoxaparin Sodium (Lovenox)  40 mg SC DAILY [facility]


   Last Admin: 10/07/18 09:30 Dose:  Not Given


Famotidine (Pepcid)  20 mg PO BID [facility]


   Last Admin: 10/07/18 17:34 Dose:  20 mg


Furosemide (Lasix)  40 mg IVP Q24H [facility]


   Last Admin: 10/07/18 13:25 Dose:  40 mg


Lisinopril (Zestril)  10 mg PO DAILY [facility]


   Last Admin: 10/07/18 09:29 Dose:  10 mg


Nicotine (Nicoderm Cq)  1 patch TD DAILY [facility]


   Last Admin: 10/07/18 09:29 Dose:  1 patch


Rosuvastatin Calcium (Crestor)  5 mg PO HS [facility]


   Last Admin: 10/06/18 22:14 Dose:  5 mg











- Labs


Labs: 


                                        





                                 10/07/18 07:16 





                                 10/07/18 07:16 





                                        











PT  12.0 SECONDS (9.7-12.2)   10/04/18  16:55    


 


INR  1.1   10/04/18  16:55    


 


APTT  35 SECONDS (21-34)  H  10/04/18  16:55    














- Constitutional


Appears: Non-toxic, No Acute Distress





- Head Exam


Head Exam: NORMAL INSPECTION





- Eye Exam


Eye Exam: EOMI





- ENT Exam


ENT Exam: Mucous Membranes Moist





- Respiratory Exam


Respiratory Exam: Clear to Ausculation Bilateral





- Cardiovascular Exam


Cardiovascular Exam: REGULAR RHYTHM, +S1, +S2





- GI/Abdominal Exam


GI & Abdominal Exam: Soft, Normal Bowel Sounds.  absent: Distended, Firm, 

Guarding, Rigid, Tenderness, Rebound





- Extremities Exam


Extremities Exam: Pedal Edema (trace).  absent: Tenderness





- Back Exam


Back Exam: absent: CVA tenderness (L), CVA tenderness (R)





- Neurological Exam


Neurological Exam: Alert, Awake, Oriented x3





- Psychiatric Exam


Psychiatric exam: Normal Affect, Normal Mood





- Skin


Skin Exam: Dry, Intact, Normal Color, Warm





Assessment and Plan


(1) CHF (congestive heart failure)


Assessment & Plan: 


cardiology on board


Official echocardiogram pending read


Monitor weights


Monitor intake and outputs


Aspirin 81 mg once a day


Increase Coreg to 12.5 mg by mouth twice today


Lasix 40 mg IV once a day


Increase lisinopril to 20 mg once a day





Patient is recommended for cardiac cath however he expresses reservation we did 

to follow-up with Dr. Rocha in regards to patient's a hesitancy will be up to the

patient to determine  if he allows it would be schedule  tomorrow in the evening


Status: Acute   





(2) Uncontrolled hypertension


Assessment & Plan: 


Coreg 12.5 mg by mouth twice a day


Lisinopril 20 mg once a day


Lasix 40 mg IV once a day


Aspirin 81 mg by mouth  daily


Status: Acute   





(3) Erectile dysfunction


Assessment & Plan: 


note patient was on Viagra for the  past 3 months. I did indicate to him the 

side efect is hypotension if he continues to take his current  pressure 

medications  with Viagra


Status: Chronic   





(4) Tobacco abuse


Assessment & Plan: 


NicoDerm 1 patch transdermal once a day


Status: Acute   





(5) Renal cyst


Assessment & Plan: 


multiple left-sided renal cysts the largest measuring 6.8 cm as described.. 

Small right-sided effusion with minor right  basilar atelectasis.  Cardiomegaly.

Few scattered colonic diverticuli withou Slightly nodular appearing adrenal g. 

Cardiomegaly





abdominal ultrasound echogenic liver may be seen in the setting of hepatic 

parenchymal fatty infiltration. Left renal cysts measuring up to 6.6 cm. 





patient advised to stop smoking. Family history father had kidney cancer. A she 

will need further follow-up in renal cyst.


Status: Acute   





(6) Prophylactic measure


Assessment & Plan: 


Lovenox 40 mg daily


Pepcid 20 mg by mouth twice a day


Status: Acute

## 2018-10-08 VITALS
SYSTOLIC BLOOD PRESSURE: 131 MMHG | OXYGEN SATURATION: 96 % | HEART RATE: 78 BPM | DIASTOLIC BLOOD PRESSURE: 89 MMHG | TEMPERATURE: 98.3 F

## 2018-10-08 LAB
ALBUMIN SERPL-MCNC: 3.7 G/DL (ref 3.5–5)
ALBUMIN/GLOB SERPL: 1.2 {RATIO} (ref 1–2.1)
ALT SERPL-CCNC: 21 U/L (ref 21–72)
AST SERPL-CCNC: 32 U/L (ref 17–59)
BASOPHILS # BLD AUTO: 0 K/UL (ref 0–0.2)
BASOPHILS NFR BLD: 0.7 % (ref 0–2)
BUN SERPL-MCNC: 26 MG/DL (ref 9–20)
CALCIUM SERPL-MCNC: 8.9 MG/DL (ref 8.6–10.4)
EOSINOPHIL # BLD AUTO: 0.1 K/UL (ref 0–0.7)
EOSINOPHIL NFR BLD: 1.8 % (ref 0–4)
ERYTHROCYTE [DISTWIDTH] IN BLOOD BY AUTOMATED COUNT: 14.2 % (ref 11.5–14.5)
GFR NON-AFRICAN AMERICAN: > 60
HGB BLD-MCNC: 17.7 G/DL (ref 12–18)
LYMPHOCYTES # BLD AUTO: 1.5 K/UL (ref 1–4.3)
LYMPHOCYTES NFR BLD AUTO: 26.2 % (ref 20–40)
MCH RBC QN AUTO: 31.7 PG (ref 27–31)
MCHC RBC AUTO-ENTMCNC: 34.1 G/DL (ref 33–37)
MCV RBC AUTO: 93.1 FL (ref 80–94)
MONOCYTES # BLD: 0.7 K/UL (ref 0–0.8)
MONOCYTES NFR BLD: 12.3 % (ref 0–10)
NEUTROPHILS # BLD: 3.3 K/UL (ref 1.8–7)
NEUTROPHILS NFR BLD AUTO: 59 % (ref 50–75)
NRBC BLD AUTO-RTO: 0.1 % (ref 0–2)
PLATELET # BLD: 218 K/UL (ref 130–400)
PMV BLD AUTO: 9.6 FL (ref 7.2–11.7)
RBC # BLD AUTO: 5.58 MIL/UL (ref 4.4–5.9)
WBC # BLD AUTO: 5.7 K/UL (ref 4.8–10.8)

## 2018-10-08 RX ADMIN — ENOXAPARIN SODIUM SCH MG: 40 INJECTION SUBCUTANEOUS at 10:10

## 2018-10-08 NOTE — CP.PCM.PN
Subjective





- Date & Time of Evaluation


Date of Evaluation: 10/08/18


Time of Evaluation: 10:35





- Subjective


Subjective: 


Medical attending note





Patient seen, examined, case discussed with medical resident. Discussed with 

patient at bedside multiple things: 1 we did discuss with him the findings of 

his echo which indicated that he has have severe systolic heart failure by 20-

25% per reading, and that there are noted abnormalities of trabeculated which ma

y be suspicious for thrombus. I did indicate to him that he may need further 

studies with a specialized echo to see if this is the case consists this would 

put him at risk for stroke. I also did indicate to the patient who seemed very 

insistent on me showing him his actual echo imaging that I am not trained an 

echo that this is a report by a train cardiologists who has read his 

echocardiogram and noted this finding. Patient was very insistent and I did 

indicate I'm not comfortable showing him an echo thigh myself cannot personally 

shown since I'm not trained in this field that this would be a better question 

suited to a cardiologist. We also did indicate to the patient that when people 

have severe systolic dysfunction he may be recommended for something called a 

LifeVest which is sometimes getting to those individuals with a low ejection 

fraction in light of potential deadly arrhythmia as a bridge for a defibrillator

if he needs it. I also did indicate to him this would be a follow-up question 

with with the cardiologist who has not done rounds at this time. Patient was 

scheduled for a cardiac cath today a for the evening time and was recommended 

for outpatient follow-up to reschedule cath prior to the report of the 

echocardiogram been finalized last night. Patient appears very upset reports 

that he has a job lined up for tomorrow however I did indicate to him that this 

report was not available to me at the time of my rounds yesterday but is 

available at which is why we are having this discussion today





I have also shared with him his CAT scan report and his abdominal ultrasound 

report noting that he does have a large renal cyst and in light of his dad 

having kidney cancer and his current risk of smoking that this is something that

would need to be monitored further to avoid cancer that this is something that 

needs to be followed up given that he currently does not have a primary care 

doctor.





I also did indicate to him that he's had hypertension for quite some time and 

for some reason he has chosen not to taking his medications as directed. He had 

noted to me that he had taken his medications all at once when he did take blood

pressure medication and stopped on his own. I did indicate to him that is a 

chronic stress on the body including his heart by not taking medications to help

control the blood pressure. Patient also reluctantly admits that he is a smoker 

though he has no cravings in the hospital for the past 5 days he admits 

reluctantly to possibly 30 years plus of smoking. I did indicate to him as well 

that going forward by him not smoking she is preserving whatever function that 

has not been affected by smoking however that does not reverse the impact of 

smoking that has had on the body for the past 30 years. Patient is again very 

frustrated and also ignoring the education and relate in light of his chronic 

conditions.





I also did indicate to him since he is currently not on Viagra while being 

hospitalized that if he chooses to go back on Viagra that he would need his 

blood pressure monitor since the side effect of Viagra his hypotension or low 

blood pressure. Patient reports he will not go back on Viagra. Yesterday patient

thought the Viagra causes hypertension and I told him it's actually the opposite

the concern for hypotension.





I did ask for him to repeat back what he learned from hypertension and smoking 

just so I get an indication of how much he is understanding he continues to 

ignore the impact that these 2 risk factors have made to his body over time.





This is based my conversations with patient as of this morning. My resident has 

followed up with cardiology especially in light of the echo results which 

changed the plan for potential discharge to continue inpatient workup in light 

of a potential thrombus in the heart.





I did see the patient again this afternoon. Resident has indicated to me that 

patient is very upset and wants to leave the hospital as a discharge however I 

did have another conversation with the patient indicating the because the echo 

which would warrant further investigation that he would be leaving AGAINST 

MEDICAL ADVICE.





Patient was scheduled for cardiac cath this evening. We did speak with the 

cardiologist edgar Rocha in light of the echo findings noted that the essentially

a thrombus cannot be ruled out. I did explain to the patient he may need a 

specialized echo to see if this thrombus is present or not given that right now 

we have one echo and based on that one echo we cannot say he does not have a 

risk for potential stroke. I did have this conversation with both his nurse Suze hutton as well as with my medical resident who was present. In light of the echo 

findings specifically the potential for microthrombi and potential stroke, 

patient would be leaving AGAINST MEDICAL ADVICE if he chooses to leave today. I 

also did indicate to him that we he does have access a patient portal to show 

him his information and that he will need to speak with medical records in 

regards to his reports completed during hospitalization.





Patient is also very insistent on getting a blood test known as a type and 

screen. I did indicate to him he he does not have a medical condition where in 

we are thinking of giving him blood products or having any invasive procedure 

that would require giving blood products to warrant such a test. I also did 

indicate to him that type and screen is usually good for about 24 hours and if 

he needed blood products that we would do this test in preparation for.





Patient is asking for information in regards to statistics regarding things like

LifeVest, operations, procedures, and I have told him that this information that

he seeking would be better sought from the cardiologist who performs these 

procedures and and makes these recommendations on a daily basis. I also did 

indicate to him I have no problem showing his reports since these reports are 

read by trained professionals in their field but I myself cannot read an echo to

show him where the abnormality is given that I'm not trained an echos.





Patient is also asking for information in regards to writing a letter saying for

the hospital to assume costs for a job he is signed on for tomorrow. I told him 

that in terms of my medical opinion that if this abnormality is in the 

echocardiogram is showing potential clot and is he is at risk for stroke he 

would need further testing to see if this is truly the case. Patient is 

requesting this follow up echo can be done outpatient and can have told him this

would need further in investigation as inpatient. I also did indicate to him 

that no one here is trying to prevent him from his job, however he medically he 

is not a safe discharge. Also I have indicated to him if he does not want to 

sign the AMA form he does not have to.





We also did indicate with to him that the cardiologist is planning on speaking 

with him in the evening at around 7 PM. Patient goes back and forth in regards 

to waiting on the cardiologist. We have also discussed with cardiology in light 

of patient wanting to leave AGAINST MEDICAL ADVICE in light of the new echo find

ings he did recommend for aspirin 81 mg once a day, Plavix 75 mg once a day, 

Coreg 12.5 mg by mouth twice a day, lisinopril 10 mg once a day, Crestor 5 mg 

once in the evening 1 month supply with the clinic appointment which we are 

making for him. These know in review of our EMR last time he is visited our 

clinic was December 2017 and he did admit to me at bedside that he does not have

a primary care doctor.





I have expressed to him that there is bleeding risk associated with aspirin and 

Plavix that these are recommended in light of a potential clot in the heart. We 

did indicate to him that his blood pressure medications such as choric and 

lisinopril are to help with his heart condition but they will need to be 

monitored and adjusted by primary care doctor. We did start him on a Crestor whi

ch is a statin which side effects include muscle aches and pains which he admits

he does not have but this is also something to monitor as well.





Patient did express to me some type of "horse bleeding" associated with Lasix I 

did pull it up on google since I was not familiar with what he was saying 

negative did indicate to him I did not see any medical study confirming this 

side effect of Lasix. 





We did indicate to him he does put himself at risk for deadly heart arrhythmias 

or abnormal heart rhythms, stroke as well as cardiopulmonary arrest he chooses 

to leave AGAINST MEDICAL ADVICE.





Objective





- Vital Signs/Intake and Output


Vital Signs (last 24 hours): 


                                        











Temp Pulse Resp BP Pulse Ox


 


 98.3 F   78   20   131/89   96 


 


 10/08/18 15:00  10/08/18 15:00  10/08/18 15:00  10/08/18 15:00  10/08/18 15:00








Intake and Output: 


                                        











 10/08/18 10/08/18





 06:59 18:59


 


Intake Total 10 500


 


Output Total 1500 


 


Balance -1490 500














- Medications


Medications: 


                               Current Medications





Acetaminophen (Tylenol 325mg Tab)  650 mg PO Q6 PRN


   PRN Reason: Headache


   Last Admin: 10/04/18 22:13 Dose:  650 mg


Albuterol/Ipratropium (Duoneb 3 Mg/0.5 Mg (3 Ml) Ud)  3 ml INH RQ6 PRN


   PRN Reason: Shortness of Breath


Aspirin (Aspirin Chewable)  81 mg PO DAILY [facility]


   Last Admin: 10/08/18 10:10 Dose:  81 mg


Carvedilol (Coreg)  12.5 mg PO BID [facility]


   Last Admin: 10/08/18 10:09 Dose:  12.5 mg


Enoxaparin Sodium (Lovenox)  40 mg SC DAILY [facility]


   Last Admin: 10/08/18 10:10 Dose:  40 mg


Famotidine (Pepcid)  20 mg PO BID [facility]


   Last Admin: 10/08/18 10:10 Dose:  20 mg


Furosemide (Lasix)  40 mg IVP Q24H [facility]


   Last Admin: 10/08/18 12:43 Dose:  40 mg


Lisinopril (Zestril)  20 mg PO DAILY [facility]


   Last Admin: 10/08/18 10:10 Dose:  20 mg


Lisinopril (Zestril)  10 mg PO ONCE ONE


   Stop: 10/08/18 21:45


Nicotine (Nicoderm Cq)  1 patch TD DAILY [facility]


   Last Admin: 10/08/18 10:11 Dose:  1 patch


Rosuvastatin Calcium (Crestor)  5 mg PO HS [facility]


   Last Admin: 10/07/18 21:38 Dose:  5 mg











- Labs


Labs: 


                                        





                                 10/08/18 07:46 





                                 10/08/18 07:46 





                                        











PT  12.0 SECONDS (9.7-12.2)   10/04/18  16:55    


 


INR  1.1   10/04/18  16:55    


 


APTT  35 SECONDS (21-34)  H  10/04/18  16:55    














- Constitutional


Appears: Non-toxic, No Acute Distress





- Head Exam


Head Exam: NORMAL INSPECTION





- Eye Exam


Eye Exam: EOMI





- ENT Exam


ENT Exam: Mucous Membranes Moist





- Respiratory Exam


Respiratory Exam: Clear to Ausculation Bilateral, NORMAL BREATHING PATTERN.  

absent: Rales, Rhonchi, Wheezes





- Cardiovascular Exam


Cardiovascular Exam: REGULAR RHYTHM, +S1, +S2





- GI/Abdominal Exam


GI & Abdominal Exam: Soft, Normal Bowel Sounds.  absent: Distended, Firm, G

uarding, Rigid, Tenderness, Rebound





- Extremities Exam


Extremities Exam: absent: Pedal Edema, Tenderness





- Neurological Exam


Neurological Exam: Alert, Awake, Oriented x3





- Psychiatric Exam


Psychiatric exam: Agitated, Anxious





- Skin


Skin Exam: Dry, Intact, Normal Color, Warm





Assessment and Plan


(1) Left against medical advice


Status: Acute   





(2) CHF (congestive heart failure)


Status: Acute   





(3) Uncontrolled hypertension


Status: Acute   





(4) Erectile dysfunction


Status: Chronic   





(5) Tobacco abuse


Status: Acute   





(6) Renal cyst


Status: Acute   





(7) Prophylactic measure


Status: Acute

## 2018-10-08 NOTE — CP.PCM.DIS
Provider





- Provider


Date of Admission: 


10/04/18 17:38





Attending physician: 


Columba Whitlock DO





Time Spent in preparation of Discharge (in minutes): 35





Hospital Course





- Lab Results


Lab Results: 


                             Most Recent Lab Values











WBC  5.7 K/uL (4.8-10.8)   10/08/18  07:46    


 


RBC  5.58 Mil/uL (4.40-5.90)   10/08/18  07:46    


 


Hgb  17.7 g/dL (12.0-18.0)   10/08/18  07:46    


 


Hct  51.9 % (35.0-51.0)  H  10/08/18  07:46    


 


MCV  93.1 fL (80.0-94.0)   10/08/18  07:46    


 


MCH  31.7 pg (27.0-31.0)  H  10/08/18  07:46    


 


MCHC  34.1 g/dL (33.0-37.0)   10/08/18  07:46    


 


RDW  14.2 % (11.5-14.5)   10/08/18  07:46    


 


Plt Count  218 K/uL (130-400)   10/08/18  07:46    


 


MPV  9.6 fL (7.2-11.7)   10/08/18  07:46    


 


Neut % (Auto)  59.0 % (50.0-75.0)   10/08/18  07:46    


 


Lymph % (Auto)  26.2 % (20.0-40.0)   10/08/18  07:46    


 


Mono % (Auto)  12.3 % (0.0-10.0)  H  10/08/18  07:46    


 


Eos % (Auto)  1.8 % (0.0-4.0)   10/08/18  07:46    


 


Baso % (Auto)  0.7 % (0.0-2.0)   10/08/18  07:46    


 


Neut # (Auto)  3.3 K/uL (1.8-7.0)   10/08/18  07:46    


 


Lymph # (Auto)  1.5 K/uL (1.0-4.3)   10/08/18  07:46    


 


Mono # (Auto)  0.7 K/uL (0.0-0.8)   10/08/18  07:46    


 


Eos # (Auto)  0.1 K/uL (0.0-0.7)   10/08/18  07:46    


 


Baso # (Auto)  0.0 K/uL (0.0-0.2)   10/08/18  07:46    


 


PT  12.0 SECONDS (9.7-12.2)   10/04/18  16:55    


 


INR  1.1   10/04/18  16:55    


 


APTT  35 SECONDS (21-34)  H  10/04/18  16:55    


 


D-Dimer, Quantitative  470 ng/mlDDU (0-243)  H  10/04/18  16:55    


 


Sodium  140 mmol/L (132-148)   10/08/18  07:46    


 


Potassium  4.0 mmol/L (3.6-5.2)   10/08/18  07:46    


 


Chloride  101 mmol/L ()   10/08/18  07:46    


 


Carbon Dioxide  27 mmol/L (22-30)   10/08/18  07:46    


 


Anion Gap  15  (10-20)   10/08/18  07:46    


 


BUN  26 mg/dL (9-20)  H  10/08/18  07:46    


 


Creatinine  1.2 mg/dL (0.8-1.5)   10/08/18  07:46    


 


Est GFR ( Amer)  > 60   10/08/18  07:46    


 


Est GFR (Non-Af Amer)  > 60   10/08/18  07:46    


 


Random Glucose  122 mg/dL ()  H  10/08/18  07:46    


 


Hemoglobin A1c  6.7 % (4.2-6.5)  H  10/05/18  11:29    


 


Calcium  8.9 mg/dl (8.6-10.4)   10/08/18  07:46    


 


Phosphorus  3.7 mg/dL (2.5-4.5)   10/08/18  07:46    


 


Magnesium  2.0 mg/dL (1.6-2.3)   10/08/18  07:46    


 


Total Bilirubin  1.0 mg/dL (0.2-1.3)   10/08/18  07:46    


 


AST  32 U/L (17-59)   10/08/18  07:46    


 


ALT  21 U/L (21-72)  D 10/08/18  07:46    


 


Alkaline Phosphatase  76 U/L ()   10/08/18  07:46    


 


Total Creatine Kinase  114 U/L ()   10/05/18  11:29    


 


CK-MB (Mass)  2.17 ng/mL (0.0-3.38)   10/05/18  11:29    


 


Troponin I  0.0580 ng/mL (0.00-0.120)   10/05/18  11:29    


 


NT-Pro-B Natriuret Pep  3710 pg/mL (0-900)  H  10/04/18  15:59    


 


Total Protein  6.9 g/dL (6.3-8.3)   10/08/18  07:46    


 


Albumin  3.7 g/dL (3.5-5.0)   10/08/18  07:46    


 


Globulin  3.2 gm/dL (2.2-3.9)   10/08/18  07:46    


 


Albumin/Globulin Ratio  1.2  (1.0-2.1)   10/08/18  07:46    


 


Triglycerides  152 mg/dL (0-149)  H  10/05/18  11:29    


 


Cholesterol  187 mg/dL (0-199)   10/05/18  11:29    


 


LDL Cholesterol Direct  115 mg/dL (0-129)   10/05/18  11:29    


 


HDL Cholesterol  36 mg/dL (30-70)   10/05/18  11:29    


 


Free T4  0.90 ng/dL (0.78-2.19)   10/05/18  11:29    


 


TSH 3rd Generation  0.84 mIU/L (0.46-4.68)   10/05/18  11:29    


 


Urine Color  Yellow  (YELLOW)   10/04/18  17:11    


 


Urine Clarity  Clear  (Clear)   10/04/18  17:11    


 


Urine pH  6.0  (5.0-8.0)   10/04/18  17:11    


 


Ur Specific Gravity  1.004  (1.003-1.030)   10/04/18  17:11    


 


Urine Protein  Negative mg/dL (NEGATIVE)   10/04/18  17:11    


 


Urine Glucose (UA)  Normal mg/dL (Normal)   10/04/18  17:11    


 


Urine Ketones  Negative mg/dL (NEGATIVE)   10/04/18  17:11    


 


Urine Blood  Negative  (NEGATIVE)   10/04/18  17:11    


 


Urine Nitrate  Negative  (NEGATIVE)   10/04/18  17:11    


 


Urine Bilirubin  Negative  (NEGATIVE)   10/04/18  17:11    


 


Urine Urobilinogen  Normal mg/dL (0.2-1.0)   10/04/18  17:11    


 


Ur Leukocyte Esterase  Neg Rachelle/uL (Negative)   10/04/18  17:11    


 


Urine WBC (Auto)  < 1 /hpf (0-5)   10/04/18  17:11    


 


Urine Bacteria  Rare  (<OCC)   10/04/18  17:11    


 


Urine Opiates Screen  Negative  (NEGATIVE)   10/04/18  17:11    


 


Urine Methadone Screen  Negative  (NEGATIVE)   10/04/18  17:11    


 


Ur Barbiturates Screen  Negative  (NEGATIVE)   10/04/18  17:11    


 


Ur Phencyclidine Scrn  Negative  (NEGATIVE)   10/04/18  17:11    


 


Ur Amphetamines Screen  Negative  (NEGATIVE)   10/04/18  17:11    


 


U Benzodiazepines Scrn  Negative  (NEGATIVE)   10/04/18  17:11    


 


U Oth Cocaine Metabols  Negative  (NEGATIVE)   10/04/18  17:11    


 


U Cannabinoids Screen  Negative  (NEGATIVE)   10/04/18  17:11    














Discharge Exam





- Head Exam


Head Exam: NORMAL INSPECTION





Discharge Plan





- Discharge Medications


Prescriptions: 


Aspirin [Aspirin Chewable] 81 mg PO DAILY 30 Days #30 chew


Carvedilol [Coreg] 12.5 mg PO BID 30 Days #60 tab


Clopidogrel [Plavix] 75 mg PO DAILY 30 Days #30 tab


Furosemide [Lasix] 20 mg PO DAILY 30 Days #30 tablet


Lisinopril [Zestril] 20 mg PO DAILY 30 Days #30 tab


Nicotine 14 mg/24 hr [Nicoderm CQ] 1 patch TD DAILY #30 patch


Rosuvastatin Calcium [Crestor] 5 mg PO HS #30 tab





- Follow Up Plan


Condition: FAIR


Disposition: AGAINST MEDICAL ADVICE

## 2018-10-08 NOTE — CARD
--------------- APPROVED REPORT --------------





Date of service: 10/04/2018



EKG Measurement

Heart [id number]

MN [id number]

[id number]

[id number]

[id number]



<Conclusion>

Normal sinus rhythm

Possible Left atrial enlargement

Left axis deviation

Left ventricular hypertrophy with repolarization abnormality

Prolonged QT

Abnormal ECG

## 2018-10-09 NOTE — CP.PCM.DIS
Provider





- Provider


Date of Admission: 


10/04/18 17:38





Attending physician: 


Columba Whitlock, [facility]





- Lab Results


Lab Results: 


                             Most Recent Lab Values











WBC  5.7 K/uL (4.8-10.8)   10/08/18  07:46    


 


RBC  5.58 Mil/uL (4.40-5.90)   10/08/18  07:46    


 


Hgb  17.7 g/dL (12.0-18.0)   10/08/18  07:46    


 


Hct  51.9 % (35.0-51.0)  H  10/08/18  07:46    


 


MCV  93.1 fL (80.0-94.0)   10/08/18  07:46    


 


MCH  31.7 pg (27.0-31.0)  H  10/08/18  07:46    


 


MCHC  34.1 g/dL (33.0-37.0)   10/08/18  07:46    


 


RDW  14.2 % (11.5-14.5)   10/08/18  07:46    


 


Plt Count  218 K/uL (130-400)   10/08/18  07:46    


 


MPV  9.6 fL (7.2-11.7)   10/08/18  07:46    


 


Neut % (Auto)  59.0 % (50.0-75.0)   10/08/18  07:46    


 


Lymph % (Auto)  26.2 % (20.0-40.0)   10/08/18  07:46    


 


Mono % (Auto)  12.3 % (0.0-10.0)  H  10/08/18  07:46    


 


Eos % (Auto)  1.8 % (0.0-4.0)   10/08/18  07:46    


 


Baso % (Auto)  0.7 % (0.0-2.0)   10/08/18  07:46    


 


Neut # (Auto)  3.3 K/uL (1.8-7.0)   10/08/18  07:46    


 


Lymph # (Auto)  1.5 K/uL (1.0-4.3)   10/08/18  07:46    


 


Mono # (Auto)  0.7 K/uL (0.0-0.8)   10/08/18  07:46    


 


Eos # (Auto)  0.1 K/uL (0.0-0.7)   10/08/18  07:46    


 


Baso # (Auto)  0.0 K/uL (0.0-0.2)   10/08/18  07:46    


 


PT  12.0 SECONDS (9.7-12.2)   10/04/18  16:55    


 


INR  1.1   10/04/18  16:55    


 


APTT  35 SECONDS (21-34)  H  10/04/18  16:55    


 


D-Dimer, Quantitative  470 ng/mlDDU (0-243)  H  10/04/18  16:55    


 


Sodium  140 mmol/L (132-148)   10/08/18  07:46    


 


Potassium  4.0 mmol/L (3.6-5.2)   10/08/18  07:46    


 


Chloride  101 mmol/L ()   10/08/18  07:46    


 


Carbon Dioxide  27 mmol/L (22-30)   10/08/18  07:46    


 


Anion Gap  15  (10-20)   10/08/18  07:46    


 


BUN  26 mg/dL (9-20)  H  10/08/18  07:46    


 


Creatinine  1.2 mg/dL (0.8-1.5)   10/08/18  07:46    


 


Est GFR ( Amer)  > 60   10/08/18  07:46    


 


Est GFR (Non-Af Amer)  > 60   10/08/18  07:46    


 


Random Glucose  122 mg/dL ()  H  10/08/18  07:46    


 


Hemoglobin A1c  6.7 % (4.2-6.5)  H  10/05/18  11:29    


 


Calcium  8.9 mg/dl (8.6-10.4)   10/08/18  07:46    


 


Phosphorus  3.7 mg/dL (2.5-4.5)   10/08/18  07:46    


 


Magnesium  2.0 mg/dL (1.6-2.3)   10/08/18  07:46    


 


Total Bilirubin  1.0 mg/dL (0.2-1.3)   10/08/18  07:46    


 


AST  32 U/L (17-59)   10/08/18  07:46    


 


ALT  21 U/L (21-72)  D 10/08/18  07:46    


 


Alkaline Phosphatase  76 U/L ()   10/08/18  07:46    


 


Total Creatine Kinase  114 U/L ()   10/05/18  11:29    


 


CK-MB (Mass)  2.17 ng/mL (0.0-3.38)   10/05/18  11:29    


 


Troponin I  0.0580 ng/mL (0.00-0.120)   10/05/18  11:29    


 


NT-Pro-B Natriuret Pep  3710 pg/mL (0-900)  H  10/04/18  15:59    


 


Total Protein  6.9 g/dL (6.3-8.3)   10/08/18  07:46    


 


Albumin  3.7 g/dL (3.5-5.0)   10/08/18  07:46    


 


Globulin  3.2 gm/dL (2.2-3.9)   10/08/18  07:46    


 


Albumin/Globulin Ratio  1.2  (1.0-2.1)   10/08/18  07:46    


 


Triglycerides  152 mg/dL (0-149)  H  10/05/18  11:29    


 


Cholesterol  187 mg/dL (0-199)   10/05/18  11:29    


 


LDL Cholesterol Direct  115 mg/dL (0-129)   10/05/18  11:29    


 


HDL Cholesterol  36 mg/dL (30-70)   10/05/18  11:29    


 


Free T4  0.90 ng/dL (0.78-2.19)   10/05/18  11:29    


 


TSH 3rd Generation  0.84 mIU/L (0.46-4.68)   10/05/18  11:29    


 


Urine Color  Yellow  (YELLOW)   10/04/18  17:11    


 


Urine Clarity  Clear  (Clear)   10/04/18  17:11    


 


Urine pH  6.0  (5.0-8.0)   10/04/18  17:11    


 


Ur Specific Gravity  1.004  (1.003-1.030)   10/04/18  17:11    


 


Urine Protein  Negative mg/dL (NEGATIVE)   10/04/18  17:11    


 


Urine Glucose (UA)  Normal mg/dL (Normal)   10/04/18  17:11    


 


Urine Ketones  Negative mg/dL (NEGATIVE)   10/04/18  17:11    


 


Urine Blood  Negative  (NEGATIVE)   10/04/18  17:11    


 


Urine Nitrate  Negative  (NEGATIVE)   10/04/18  17:11    


 


Urine Bilirubin  Negative  (NEGATIVE)   10/04/18  17:11    


 


Urine Urobilinogen  Normal mg/dL (0.2-1.0)   10/04/18  17:11    


 


Ur Leukocyte Esterase  Neg Rachelle/uL (Negative)   10/04/18  17:11    


 


Urine WBC (Auto)  < 1 /hpf (0-5)   10/04/18  17:11    


 


Urine Bacteria  Rare  (<OCC)   10/04/18  17:11    


 


Urine Opiates Screen  Negative  (NEGATIVE)   10/04/18  17:11    


 


Urine Methadone Screen  Negative  (NEGATIVE)   10/04/18  17:11    


 


Ur Barbiturates Screen  Negative  (NEGATIVE)   10/04/18  17:11    


 


Ur Phencyclidine Scrn  Negative  (NEGATIVE)   10/04/18  17:11    


 


Ur Amphetamines Screen  Negative  (NEGATIVE)   10/04/18  17:11    


 


U Benzodiazepines Scrn  Negative  (NEGATIVE)   10/04/18  17:11    


 


U Oth Cocaine Metabols  Negative  (NEGATIVE)   10/04/18  17:11    


 


U Cannabinoids Screen  Negative  (NEGATIVE)   10/04/18  17:11    














Discharge Exam





- Head Exam


Head Exam: NORMAL INSPECTION





Discharge Plan





- Discharge Medications


Prescriptions: 


Aspirin [Aspirin Chewable] 81 mg PO DAILY 30 Days #30 chew


Carvedilol [Coreg] 12.5 mg PO BID 30 Days #60 tab


Clopidogrel [Plavix] 75 mg PO DAILY 30 Days #30 tab


Furosemide [Lasix] 20 mg PO DAILY 30 Days #30 tablet


Lisinopril [Zestril] 20 mg PO DAILY 30 Days #30 tab


Nicotine 14 mg/24 hr [Nicoderm CQ] 1 patch TD DAILY #30 patch


Rosuvastatin Calcium [Crestor] 5 mg PO HS #30 tab





- Follow Up Plan


Condition: FAIR


Disposition: AGAINST MEDICAL ADVICE

## 2019-02-06 ENCOUNTER — HOSPITAL ENCOUNTER (EMERGENCY)
Dept: HOSPITAL 31 - C.ER | Age: 59
Discharge: HOME | End: 2019-02-06
Payer: SELF-PAY

## 2019-02-06 VITALS — RESPIRATION RATE: 16 BRPM | SYSTOLIC BLOOD PRESSURE: 164 MMHG | DIASTOLIC BLOOD PRESSURE: 102 MMHG | HEART RATE: 88 BPM

## 2019-02-06 VITALS — OXYGEN SATURATION: 94 %

## 2019-02-06 VITALS — BODY MASS INDEX: 35 KG/M2

## 2019-02-06 VITALS — TEMPERATURE: 98.1 F

## 2019-02-06 DIAGNOSIS — I10: ICD-10-CM

## 2019-02-06 DIAGNOSIS — I50.9: Primary | ICD-10-CM

## 2019-02-06 LAB
ALBUMIN SERPL-MCNC: 4.1 G/DL (ref 3.5–5)
ALBUMIN/GLOB SERPL: 1.4 {RATIO} (ref 1–2.1)
ALT SERPL-CCNC: 16 U/L (ref 21–72)
AST SERPL-CCNC: 28 U/L (ref 17–59)
BASOPHILS # BLD AUTO: 0.1 K/UL (ref 0–0.2)
BASOPHILS NFR BLD: 0.9 % (ref 0–2)
BNP SERPL-MCNC: 2870 PG/ML (ref 0–900)
BUN SERPL-MCNC: 13 MG/DL (ref 9–20)
CALCIUM SERPL-MCNC: 8.6 MG/DL (ref 8.6–10.4)
EOSINOPHIL # BLD AUTO: 0.1 K/UL (ref 0–0.7)
EOSINOPHIL NFR BLD: 0.9 % (ref 0–4)
ERYTHROCYTE [DISTWIDTH] IN BLOOD BY AUTOMATED COUNT: 13.8 % (ref 11.5–14.5)
GFR NON-AFRICAN AMERICAN: > 60
HGB BLD-MCNC: 14.9 G/DL (ref 12–18)
LYMPHOCYTES # BLD AUTO: 1.5 K/UL (ref 1–4.3)
LYMPHOCYTES NFR BLD AUTO: 22.6 % (ref 20–40)
MCH RBC QN AUTO: 32.1 PG (ref 27–31)
MCHC RBC AUTO-ENTMCNC: 33.6 G/DL (ref 33–37)
MCV RBC AUTO: 95.5 FL (ref 80–94)
MONOCYTES # BLD: 0.5 K/UL (ref 0–0.8)
MONOCYTES NFR BLD: 7.7 % (ref 0–10)
NEUTROPHILS # BLD: 4.4 K/UL (ref 1.8–7)
NEUTROPHILS NFR BLD AUTO: 67.9 % (ref 50–75)
NRBC BLD AUTO-RTO: 0 % (ref 0–2)
PLATELET # BLD: 203 K/UL (ref 130–400)
PMV BLD AUTO: 10 FL (ref 7.2–11.7)
RBC # BLD AUTO: 4.65 MIL/UL (ref 4.4–5.9)
WBC # BLD AUTO: 6.4 K/UL (ref 4.8–10.8)

## 2019-02-06 PROCEDURE — 99284 EMERGENCY DEPT VISIT MOD MDM: CPT

## 2019-02-06 PROCEDURE — 96374 THER/PROPH/DIAG INJ IV PUSH: CPT

## 2019-02-06 PROCEDURE — 93005 ELECTROCARDIOGRAM TRACING: CPT

## 2019-02-06 PROCEDURE — 85025 COMPLETE CBC W/AUTO DIFF WBC: CPT

## 2019-02-06 PROCEDURE — 84484 ASSAY OF TROPONIN QUANT: CPT

## 2019-02-06 PROCEDURE — 71045 X-RAY EXAM CHEST 1 VIEW: CPT

## 2019-02-06 PROCEDURE — 80053 COMPREHEN METABOLIC PANEL: CPT

## 2019-02-06 PROCEDURE — 83880 ASSAY OF NATRIURETIC PEPTIDE: CPT

## 2019-02-06 NOTE — C.PDOC
History Of Present Illness


58 year old male with a Hx of HTN ran out of his medication a few weeks ago 

presents today with SOB on exertion. Patient reports the SOB is worse when 

laying supine and reports also having some chest pain. He had a similar 

presentation in October, at that time he was noncompliant with his medications, 

found to be in CHF and admitted for several days. Denies fever, chills, nausea, 

or vomiting.


Time Seen by Provider: 02/06/19 20:04


Chief Complaint (Nursing): Chest Pain


History Per: Patient


History/Exam Limitations: no limitations


Onset/Duration Of Symptoms: Days


Current Symptoms Are (Timing): Still Present


Modifying Factors: None


Exacerbating Factors: Exertion, Other (Laying supine)


Alleviating Factors: None


Recent travel outside of the [place]: No





Past Medical History


Reviewed: Historical Data, Nursing Documentation, Vital Signs


Vital Signs: 





                                Last Vital Signs











Temp  98.1 F   02/06/19 20:14


 


Pulse  102 H  02/06/19 19:51


 


Resp  24   02/06/19 19:51


 


BP  182/118 H  02/06/19 19:51


 


Pulse Ox  94 L  02/06/19 19:51














- Medical History


PMH: HTN


Family History: States: Unknown Family Hx





- Social History


Hx Alcohol Use: No


Hx Substance Use: No





- Immunization History


Hx Tetanus Toxoid Vaccination: No


Hx Influenza Vaccination: No


Hx Pneumococcal Vaccination: No





Review Of Systems


Constitutional: Negative for: Fever, Chills


Cardiovascular: Negative for: Chest Pain, Palpitations


Respiratory: Positive for: SOB with Excertion.  Negative for: Cough


Gastrointestinal: Negative for: Nausea, Vomiting


Neurological: Negative for: Weakness, Numbness





Physical Exam





- Physical Exam


Appears: Non-toxic


Skin: Normal Color, Warm, Dry


Head: Atraumatic, Normacephalic


Eye(s): bilateral: Normal Inspection


Oral Mucosa: Moist


Neck: Normal, Supple


Chest: Symmetrical, No Tenderness


Cardiovascular: Rhythm Regular


Respiratory: Normal Breath Sounds, No Rales, No Rhonchi, No Wheezing


Gastrointestinal/Abdominal: Soft, No Tenderness


Back: No CVA Tenderness


Neurological/Psych: Oriented x3, Normal Speech





ED Course And Treatment





- Laboratory Results


Result Diagrams: 


                                 02/06/19 20:47





                                 02/06/19 20:47


Lab Interpretation: No Acute Changes (BNP 2870 with normal Troponin)


ECG: Interpreted By Me


ECG Rhythm: Sinus Rhythm (with LVH), L BBB (incomplete)


O2 Sat by Pulse Oximetry: 94 (Room air)


Pulse Ox Interpretation: Normal





- Radiology


CXR: Interpreted by Me


CXR Interpretation: Yes: No Acute Disease, Cardiomegaly


Progress Note: EKG, blood work, and CXR ordered. Coreg, lasix, and zestril 

administered.


Reevaluation Time: 21:39


Reassessment Condition: Improved (Patient diuresed 1000 ml dilute urine and BP 

164/100 and he has no shortness of breath.)





Disposition


Counseled Patient/Family Regarding: Studies Performed, Diagnosis, Need For 

Followup, Rx Given





- Disposition


Referrals: 


[facility] [Outside]


Disposition: HOME/ ROUTINE


Disposition Time: 21:43


Condition: IMPROVED


Prescriptions: 


Carvedilol [Coreg] 6.25 mg PO DAILY #30 tab


Furosemide [Lasix] 20 mg PO DAILY #30 tab


Lisinopril [Zestril] 20 mg PO DAILY #30 tab


Instructions:  Heart Failure, Adult (DC), Heart Healthy Diet


Forms:  [facility] (English)





- Clinical Impression


Clinical Impression: 


 CHF (congestive heart failure), Congestive heart failure








- Scribe Statement


The provider has reviewed the documentation as recorded by the Scribe





Srinivas Hernandez





All medical record entries made by the Scribe were at my direction and 

personally dictated by me. I have reviewed the chart and agree that the record 

accurately reflects my personal performance of the history, physical exam, 

medical decision making, and the department course for this patient. I have also

 personally directed, reviewed, and agree with the discharge instructions and 

disposition.

## 2019-02-07 NOTE — CARD
--------------- APPROVED REPORT --------------





Date of service: 02/06/2019



EKG Measurement

Heart [id number]

GA 196P24

[id number]

[id number]

[id number]



<Conclusion>

Normal sinus rhythm

Possible Left atrial enlargement

Incomplete left bundle branch block

Left ventricular hypertrophy with repolarization abnormality

Prolonged QT

Abnormal ECG

## 2019-02-07 NOTE — RAD
Date of service: 



02/06/2019



HISTORY:

 SOB 



COMPARISON:

Portable chest 10/04/2018.



FINDINGS:



LUNGS:

No active pulmonary disease.



PLEURA:

No significant pleural effusion identified, no pneumothorax apparent.



CARDIOVASCULAR:

No aortic atherosclerotic calcification present.



 Cardiomegaly no pulmonary vascular congestion. 



OSSEOUS STRUCTURES:

No significant abnormalities.



VISUALIZED UPPER ABDOMEN:

Normal.



OTHER FINDINGS:

None.



IMPRESSION:

Stable cardiomegaly.  No interval acute pulmonary disease 

appreciable.  No pulmonary vascular congestion.